# Patient Record
Sex: MALE | Race: WHITE | NOT HISPANIC OR LATINO | Employment: OTHER | URBAN - METROPOLITAN AREA
[De-identification: names, ages, dates, MRNs, and addresses within clinical notes are randomized per-mention and may not be internally consistent; named-entity substitution may affect disease eponyms.]

---

## 2017-01-26 ENCOUNTER — ALLSCRIPTS OFFICE VISIT (OUTPATIENT)
Dept: OTHER | Facility: OTHER | Age: 72
End: 2017-01-26

## 2017-05-01 DIAGNOSIS — I50.30 DIASTOLIC CONGESTIVE HEART FAILURE (HCC): ICD-10-CM

## 2017-05-01 DIAGNOSIS — I42.8 OTHER CARDIOMYOPATHIES (HCC): ICD-10-CM

## 2017-05-09 ENCOUNTER — ALLSCRIPTS OFFICE VISIT (OUTPATIENT)
Dept: OTHER | Facility: OTHER | Age: 72
End: 2017-05-09

## 2017-06-06 ENCOUNTER — HOSPITAL ENCOUNTER (OUTPATIENT)
Dept: NON INVASIVE DIAGNOSTICS | Facility: CLINIC | Age: 72
Discharge: HOME | End: 2017-06-06
Payer: MEDICARE

## 2017-06-06 DIAGNOSIS — I50.30 DIASTOLIC CONGESTIVE HEART FAILURE (HCC): ICD-10-CM

## 2017-06-06 DIAGNOSIS — I42.8 OTHER CARDIOMYOPATHIES (HCC): ICD-10-CM

## 2017-06-06 PROCEDURE — 93306 TTE W/DOPPLER COMPLETE: CPT

## 2017-08-01 ENCOUNTER — ALLSCRIPTS OFFICE VISIT (OUTPATIENT)
Dept: OTHER | Facility: OTHER | Age: 72
End: 2017-08-01

## 2018-01-10 NOTE — MISCELLANEOUS
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 to trial Voltaren, but he is not following his INR  He or his wife should make sure with whoever is following with Coumadin that is also OK with them as well  If they are not able to obtain approval, they can trial medication small amount BID to the knee for 3 days and monitor any changes with bleeding or bruising  Annie Clements - 23 Mar 2016 10:44 AM     TASK EDITED  Attempted to call pt  and LMOM to CB  TyreeAnnie dixon - 23 Mar 2016 10:44 AM     TASK IN PROGRESS   Annie Clements - 24 Mar 2016 9:25 AM     TASK EDITED  S/w Mrs Mejia and she will s/w Dr Luciana Townsend who monitors his INR and clarify with him about the Voltaren gel  She will notify us  Denis Phipps - 24 Mar 2016 10:00 AM     TASK REPLIED TO: Previously Assigned To Celena Oakley  Thank you  Active Problems    1  Bowel and bladder incontinence (788 30,787 60) (R32,R15 9)   2  Cardiomyopathy, nonischemic (425 4) (I42 9)   3  Cerebral infarction, unspecified (434 91) (I63 9)   4  CHF (congestive heart failure) (428 0) (I50 9)   5  Dysphagia (787 20) (R13 10)   6  Dystonia (781 0) (G24 9)   7  Global aphasia (784 3) (R47 02)   8  Left knee pain (719 46) (M25 562)   9  Mural thrombus of heart (410 90) (I21 3)   10  Neurogenic bladder (596 54) (N31 9)   11  Neurogenic bowel (564 81) (K59 2)   12  PAF (paroxysmal atrial fibrillation) (427 31) (I48 0)   13  Primary osteoarthritis of left knee (715 16) (M17 12)   14  Seizure   15  Shoulder subluxation, left (831 00) (S43 002A)   16  Stroke, 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)    Current Meds   1  Acetaminophen 8 Hour 650 MG Oral Tablet; TAKE 1 TABLET 3-4 TIMES DAILY AS   NEEDED;    Therapy: (Recorded:02Oct2015) to Recorded   2  Amantadine HCl - 100 MG Oral Capsule; TAKE ONE CAPSULE BY MOUTH TWICE A   DAY  DX: Embolic Stroke; Last Rx:30Yqr1971 Ordered   3  Antivert 12 5 MG TABS (Meclizine HCl); TAKE 1 TABLET 3 TIMES DAILY AS NEEDED; Therapy: (Recorded:18Feb2016) to Recorded   4  Atorvastatin Calcium 80 MG Oral Tablet; TAKE 1 TABLET DAILY; Therapy: (Recorded:59Dqv9248) to Recorded   5  Bicalutamide 50 MG Oral Tablet; TAKE 1 TABLET DAILY; Therapy: 52MHE6493 to Recorded   6  Botox 100 UNIT Injection Solution Reconstituted; every 3 months; Therapy: 27Apr2015 to (Last Rx:27Apr2015) Ordered   7  Dulcolax Stool Softener CAPS; TAKE CAPSULE  PRN; Therapy: (Pedro Dukes) to Recorded   8  Escitalopram Oxalate 10 MG Oral Tablet; TAKE 1 TABLET DAILY; Therapy: (Pedro Dukes) to Recorded   9  Hydrocortisone CREA; APPLY ONCE DAILY AS NEEDED; Therapy: (Pedro Dukes) to Recorded   10  LevETIRAcetam 100 MG/ML Oral Solution; Take 1 teaspoonful by mouth at 9am and 1    teaspoonful by mouth at 9pm;    Therapy: (Recorded:45Npb3304) to Recorded   11  Lexapro 10 MG Oral Tablet (Escitalopram Oxalate); Therapy: (Recorded:15Mar2016) to Recorded   12  Lidocaine 5 % External Patch; APPLY AS DIRECTED; Therapy: (29-29-69-33) to Recorded   13  Meclizine HCl - 12 5 MG Oral Tablet Recorded   14  Methocarbamol 750 MG Oral Tablet; TAKE 1 TABLET 3 TIMES DAILY; Last    Rx:29Oct2015 Ordered   15  Milk of Magnesia SUSP; USE AS DIRECTED; Therapy: (Pdero Dukes) to Recorded   16  MiraLax PACK (Polyethylene Glycol 3350) Recorded   17  Mirtazapine 7 5 MG Oral Tablet; TAKE 1 TABLET AT BEDTIME; Therapy: (Recorded:18Feb2016) to Recorded   18  Nystatin 303540 UNIT/GM External Powder; Use as direcred as needed; Therapy: (Recorded:30Uim1248) to Recorded   19  Senna-S TABS; TAKE 1 OR 2 TABLETS DAILY TO PREVENT CONSTIPATION; Therapy: (Pedro Dukes) to Recorded   20   TraZODone HCl - 100 MG Oral Tablet; Therapy: (Recorded:15Mar2016) to Recorded   21  Tylenol 8 Hour 650 MG Oral Tablet Extended Release; Therapy: (Recorded:15Mar2016) to Recorded   22  Vitamin D3 1000 UNIT Oral Capsule; take 1 capsule daily; Therapy: (Recorded:18Feb2016) to Recorded   23  Voltaren 1 % Transdermal Gel; Apply to affected joints 2 to 3 times daily; Therapy: 84IHE8236 to (Last Rx:15Mar2016) Ordered   24  Warfarin Sodium 1 MG Oral Tablet; TAKE 1 TABLET DAILY; Therapy: (Recorded:61Ypf1928) to Recorded   25  Warfarin Sodium 2 MG Oral Tablet; TAKE 1 TABLET DAILY; Therapy: (Recorded:23Ssf0353) to Recorded    Allergies    1   No Known Drug Allergies    Signatures   Electronically signed by : Juan C Martínez, ; Mar 24 2016 10:03AM EST                       (Author)

## 2018-01-10 NOTE — MISCELLANEOUS
Message   Recorded as Task   Date: 07/28/2016 01:23 PM, Created By: Kerri Smith   Task Name: Follow Up   Assigned To: [facility]   Regarding Patient: Javier Reddy, Status: In Progress   Comment:    Aj Motau - 28 Jul 2016 1:23 PM     TASK CREATED  Caller: Danny Arriaga, Spouse; Other; [phone] (Day)  Pt's wife,Olena,left VM today stating that the compound cream that DR Nancy De Paz ordered cannot be received through [facility] b/c they do not make it  Danny Arriaga is req us [place] [address] [place]  VM left for Olena to cb  Jesse Mota - 28 Jul 2016 1:23 PM     TASK IN PROGRESS   Jesse Mota - 28 Jul 2016 2:30 PM     TASK REASSIGNED: Previously Assigned To [facility]    Pt's wife left VM asking to fax order to [address] [place] [address] in [address] [place] [address]    Please advise  Denis Miller - 29 Jul 2016 3:48 PM     TASK REPLIED TO: Previously Assigned To Denis Miller                      As far as I'm aware, [facility] not [facility] compound cream     I can order through compounding pharmacy, [facility]  If patient's wife would prefer [facility] and they do compound creams, can you please obtain their order sheet? Mark Anthony Hamburg - 01 Aug 6656 99:78 PM     TASK EDITED             Patient wife called asking about a call from [place] - I explained that our office deals with [employer] and other compounding companies and they will mail to home - patient will contact [facility] and check prices and will give us a call if she is going [facility]  [facility] - 01 Aug 8830 08:80 PM     TASK EDITED   Stiven Joy - 01 Aug 2016 2:30 PM     TASK REPLIED TO: Previously Assigned To Denis Miller  Thank you   Mark Anthony Hamburg - 01 Aug 4411 3:59 PM     TASK EDITED                 Patient [facility] called back again  [facility]   Pt wife is requesting you send [facility]     I called pharm and they said to send an rx with the ingredients you would like and they will compound  I explained patient wife is requesting a 1 week supply to find out if cream works  Denis Miller - 02 Aug 2016 8:13 AM     TASK REPLIED TO: Previously Assigned To Denis Miller  1 week supply sent to [facility]  Rhoda Lacy - 65 Aug 2016 8:40 AM     TASK REPLIED TO: Previously Assigned To [facility]  S/w pt's wife, Arely Macias, per release, and advised of above  Arely Macias was appreciative, and states that she hates putting us through the extra step, but the [facility] pharmacy is significantly cheaper  Arely Macias was advised that it was no problem, and that [facility] should contact her or pt  when the cream is available for   Rhoda Lacy - 27 Aug 2016 8:41 AM     TASK IN PROGRESS        Active Problems    1  (HFpEF) heart failure with preserved ejection fraction (428 9) (I50 30)   2  Bowel and bladder incontinence (788 30,787 60) (R32,R15 9)   3  Cardiomyopathy, nonischemic (425 4) (I42 9)   4  Cerebral infarction, unspecified (434 91) (I63 9)   5  Dysphagia (787 20) (R13 10)   6  Dystonia (781 0) (G24 9)   7  Global aphasia (784 3) (R47 02)   8  Left knee pain (719 46) (M25 562)   9  Mural thrombus of heart (410 90) (I21 3)   10  Neurogenic bladder (596 54) (N31 9)   11  Neurogenic bowel (564 81) (K59 2)   12  PAF (paroxysmal atrial fibrillation) (427 31) (I48 0)   13  Primary osteoarthritis of left knee (715 16) (M17 12)   14  Seizure   15  Shoulder subluxation, left (831 00) (S43 002A)   16  Stroke, embolic (445 02) (B67 9)    Current Meds   1  Acetaminophen 8 Hour 650 MG TABS; TAKE 1 TABLET 3-4 TIMES DAILY AS NEEDED; Therapy: (Recorded:02Oct2015) to Recorded   2  Antivert 12 5 MG TABS (Meclizine HCl); TAKE 1 TABLET 3 TIMES DAILY AS NEEDED; Therapy: (Recorded:18Feb2016) to Recorded   3  Atorvastatin Calcium 80 MG Oral Tablet; TAKE 1 TABLET DAILY;    Therapy: (Recorded:58Kgc2330) to Recorded   4  Bicalutamide 50 MG Oral Tablet; TAKE 1 TABLET DAILY; Therapy: 40PBN9197 to Recorded   5  Dulcolax Stool Softener CAPS; TAKE CAPSULE  PRN; Therapy: (Anabela Score) to Recorded   6  Escitalopram Oxalate 10 MG Oral Tablet; TAKE 1 TABLET DAILY; Therapy: (Anabela Score) to Recorded   7  Hydrocortisone CREA; APPLY ONCE DAILY AS NEEDED; Therapy: (Anabela Score) to Recorded   8  LevETIRAcetam 100 MG/ML Oral Solution; Take 1 teaspoonful by mouth at 9am and 1   teaspoonful by mouth at 9pm;   Therapy: (Recorded:44Nqz1000) to Recorded   9  Methocarbamol 750 MG Oral Tablet; TAKE 1 TABLET 3 TIMES DAILY; Last   Rx:26Rsu8954 Ordered   10  MiraLax PACK (Polyethylene Glycol 3350) Recorded   11  Nystatin 036914 UNIT/GM External Powder; Use as direcred as needed; Therapy: (Recorded:82Foz6437) to Recorded   12  Remeron 15 MG Oral Tablet (Mirtazapine); TAKE 1/2 TABLET AT BEDTIME; Therapy: (Recorded:18Vew3166) to Recorded   13  Senna-S TABS; TAKE 1 OR 2 TABLETS DAILY TO PREVENT CONSTIPATION; Therapy: (Anabela Score) to Recorded   14  SLPG Compound Medication; Flurbiprofen 5%, Cyclobenzaprine 2%, Lidocaine 5%; Therapy: 69NKA6850 to (Evaluate:72Idu0251); Last Rx:00Ogk5595 Ordered    Allergies    1   No Known Drug Allergies    Signatures   Electronically signed by : Elder Tejeda RN; Aug  2 2016  8:41AM EST                       (Author)

## 2018-01-10 NOTE — RESULT NOTES
Message   Recorded as Task   Date: 07/11/2016 12:55 PM, Created By: Munir Panda   Task Name: Medical Complaint Callback   Assigned To: [facility]   Regarding Patient: Quang Marcos, Status: Active   Comment:    Rhoda Lacy - 11 Jul 2016 12:55 PM     TASK CREATED  Caller: Laine Torres, Spouse; Medical Complaint  Received VM from pt's wife, Naya Felipe, on [facility] triage line from [phone] am  Naya Felipe states that the pt  had a genicular nerve block, and then was given voltaren to help w/ the pain, but it is not helping pt  No c/b number provided  Jesse Mota - 11 Jul 2016 1:23 PM     TASK REPLIED TO: Previously Assigned To [employer] with pt's wife  Pt is s/p LEFT gen NB #2 6-15-16  No pain diary noted  Pt's wife states his pain level went from a 9/10 to a 7/10 after the NB  States rainy days are worse  States he sleeps well at night  They are asking if they can proceed with ablation? I advised that we like to see 50% or greater pain relief post bolock to proceed with ablation  Pt is using Glorya Salaam cream,a natural substance,with good relief  Wife asking what other tx options are available? Not intereted in refilling the voltaren  Denis Miller - 14 Jul 2016 2:07 PM     TASK REPLIED TO: Previously Assigned To Denis Miller  He did not appear to have significant relief with the second nerve block  I do not think ablation would help  We can consider other compound agents that I can order  We also considered the DRG which is a trial and implant that would require hold of anticoagulation  Jesse Mota - 14 Jul 2016 3:22 PM     TASK REPLIED TO: Previously Assigned To [facility]  Spoke with Olena,pt's wife, and advised of the same  Not interested in DRG at this time  They are interested in trying compound cream   She states she receives all his meds through the [facility]   Asking if we can mail her a script for the compound cream to see if she can have it filled through them? She is also asking for documentation that the MBB failed and that is why compound cream is being ordered  Denis Miller - 19 Jul 2016 9:20 AM     TASK REPLIED TO: Previously Assigned To Denis Miller  I have letter and order printed  The compound cream with [facility] may be different composition, so if there are other agents, I can look into it as well    She/They can send us the form   Jesse Mota - 19 Jul 2016 9:44 AM     TASK REPLIED TO: Previously Assigned To [facility]  Spoke with pt's wife,Olena  She is requesting order and letter be faxed to pt's Count includes the [facility] Brianne Paige fax #[phone] and that she could take it directly to [place] states it is a hardship to come to office to drop anything off or  b/c she cannot leave her  at home alone  Are you ok with me faxing? Denis Miller - 19 Jul 2016 10:10 AM     TASK REPLIED TO: Previously Assigned To Denis Miller                      Yes once the letter is ready, I will bring it toyJesse Mcfarland - 19 Jul 2016 1:19 PM     TASK REPLIED TO: Previously Assigned To [facility]   Compound cream order and letter faxed to Janki Fields at number provided  Signatures   Electronically signed by :  Lenny Ward, ; Jul 19 2016  1:19PM EST                       (Author)

## 2018-01-11 NOTE — RESULT NOTES
Message   Recorded as Task   Date: 05/17/2016 01:19 PM, Created By: Lexis Salcido   Task Name: Follow Up   Assigned To: [facility]   Regarding Patient: Miller Cadet, Status: In Progress   Comment:    Annie Clements - 17 May 2016 1:19 PM     TASK CREATED  Caller: Self; General Medical Question; [phone] (Home); [phone] (Mobile Phone)  Received a call from Mrs Mejia today  She is inquirying as increasing the gabapentin to TID  She wanted to get directions as to when he should take the medication  On the two gabapentin QD he still rates his pain a 7  Can he also take the 7 5 remeron at ? HD to advise  thanks   Henrietta Be - 17 May 2016 4:12 PM     TASK REPLIED TO: Previously Assigned To [facility]  He can increase it to TID  Already discussed side effect profile, she should monitor for sedation, drowsiness and unsteadiness  Annie Clements - 18 May 2016 8:18 AM     TASK EDITED  Can he take the remeron at [facility]? Henrietta Be - 18 May 2016 8:35 AM     TASK REPLIED TO: Previously Assigned To Denis Phipps  He should be care of increase in side effects with both  he can take it, but if dizziness or confusion continue or worsen, he should return to previous dose  Annie Clements - 18 May 2016 10:59 AM     TASK EDITED  Attempted to call the pt  and left a detailed mom as to the previous task per HD  Pt  to CB with questions  Annie Clements - 18 May 2016 10:59 AM     TASK IN PROGRESS        Signatures   Electronically signed by :  [facility], ; May 20 2016  8:27AM EST                       (Author)

## 2018-01-11 NOTE — MISCELLANEOUS
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, along with  global aphasia  A summary of his health information is stated b low as per wife's request  Request is made for VA benefits     -He has been improving slowly but steadily  Currently he is ambulating  feet with rolling walker at min assist  level (L KAFO) and performing stand pivot transfers at contact guard assist level    -Continue secondary stroke prophylaxis with statin, Coumadin, and modification of risk factors    -Discontinued amantadine and monitor for any worsening symptoms  He has been on this medication for one year  He has tried Provigil while he was in the hospital which was discontinued due to lack of results  Remeron added by PCP to adjust sleep-wake cycle  Educated patient wife about preventing contractures/skin breakdown  No episodes of agitation anymore and he is off Zyprexa at this point    -Patient getting great benefit from left knee ankle foot orthosis   -Severe right-sided spastic dystonia: Botox on 4/27/15, 8/10/15 and 11/9/15  Continue stretching exercises  -MSK: Left knee pain degenerative in nature  MRI of the left knee with tricompartmental arthritis with meniscal tears  Continue to follow up with pain management with Dr Adelaide Agrawal  Patient may use Robaxin as needed for spasm/pain  Continue acetaminophen  Not a candidate for anti-inflammatories due to anticoagulation  Please feel free to call me with any questions  Regards  CHRISTOPHER Carlin    Physical Medicine and Joya Santoro      Electronically signed by:Yossi Godinez DO  May 13 2016  7:24PM EST Acknowledgement

## 2018-01-12 NOTE — MISCELLANEOUS
Message   Recorded as Task   Date: 04/27/2016 11:56 AM, Created By: Jabari Lauren   Task Name: Follow Up   Assigned To: [facility]   Regarding Patient: Yuliet Leon, Status: Active   Comment:    Kesha Chahal - 27 Apr 2016 11:56 AM     TASK CREATED  Caller: Mary Cates; General Medical Question; [phone]  Wife called stating that pt's pcp said it was alright to start gabapentin as prescribed by Cathi De La Cruz  Wife just wanted to confirm if it was ok to take with keppra  Dr Phipps advised pt to contact neurologist to make sure  Wife verbalized understanding  She will call the neurologist and if it is alright to take together she will p/u the medication from the pharmacy and pt will take as per titration that was reviewed  Denis Phipps - 27 Apr 2016 12:16 PM     TASK REPLIED TO: Previously Assigned To [facility]  Thank you  Active Problems    1  Bowel and bladder incontinence (788 30,787 60) (R32,R15 9)   2  Cardiomyopathy, nonischemic (425 4) (I42 9)   3  Cerebral infarction, unspecified (434 91) (I63 9)   4  CHF (congestive heart failure) (428 0) (I50 9)   5  Dysphagia (787 20) (R13 10)   6  Dystonia (781 0) (G24 9)   7  Global aphasia (784 3) (R47 02)   8  Left knee pain (719 46) (M25 562)   9  Mural thrombus of heart (410 90) (I21 3)   10  Neurogenic bladder (596 54) (N31 9)   11  Neurogenic bowel (564 81) (K59 2)   12  PAF (paroxysmal atrial fibrillation) (427 31) (I48 0)   13  Primary osteoarthritis of left knee (715 16) (M17 12)   14  Seizure   15  Shoulder subluxation, left (831 00) (S43 002A)   16  Stroke, embolic (227 08) (S95 1)    Current Meds   1  Acetaminophen 8 Hour 650 MG Oral Tablet; TAKE 1 TABLET 3-4 TIMES DAILY AS   NEEDED; Therapy: (Recorded:02Oct2015) to Recorded   2  Amantadine HCl - 100 MG Oral Capsule; TAKE ONE CAPSULE BY MOUTH TWICE A   DAY  DX: Embolic Stroke; Last Rx:12Aao8368 Ordered   3   Antivert 12 5 MG TABS (Meclizine HCl); TAKE 1 TABLET 3 TIMES DAILY AS NEEDED; Therapy: (Recorded:44Upy3880) to Recorded   4  Atorvastatin Calcium 80 MG Oral Tablet; TAKE 1 TABLET DAILY; Therapy: (Recorded:18Xvu7047) to Recorded   5  Bicalutamide 50 MG Oral Tablet; TAKE 1 TABLET DAILY; Therapy: 82PPO8441 to Recorded   6  Botox 100 UNIT Injection Solution Reconstituted; every 3 months; Therapy: 10Lde1191 to (Last Rx:27Apr2015) Ordered   7  Dulcolax Stool Softener CAPS; TAKE CAPSULE  PRN; Therapy: (Anabela Brandt) to Recorded   8  Escitalopram Oxalate 10 MG Oral Tablet; TAKE 1 TABLET DAILY; Therapy: (Anabela Brandt) to Recorded   9  Gabapentin 100 MG Oral Capsule; TAKE 1 CAPSULE 3 TIMES DAILY; Therapy: 72GFB0921 to (Serg Earl)  Requested for: 26Apr2016; Last   Rx:26Apr2016 Ordered   10  Hydrocortisone CREA; APPLY ONCE DAILY AS NEEDED; Therapy: (Anabela Brandt) to Recorded   11  LevETIRAcetam 100 MG/ML Oral Solution; Take 1 teaspoonful by mouth at 9am and 1    teaspoonful by mouth at 9pm;    Therapy: (Recorded:03Hnd7920) to Recorded   12  Methocarbamol 750 MG Oral Tablet; TAKE 1 TABLET 3 TIMES DAILY; Last    Rx:16Zuk3404 Ordered   13  MiraLax PACK (Polyethylene Glycol 3350) Recorded   14  Nystatin 895628 UNIT/GM External Powder; Use as direcred as needed; Therapy: (Recorded:41Mkk8080) to Recorded   15  Senna-S TABS; TAKE 1 OR 2 TABLETS DAILY TO PREVENT CONSTIPATION; Therapy: (Anabela Brandt) to Recorded   16  Vitamin D3 1000 UNIT Oral Capsule; take 1 capsule daily; Therapy: (Recorded:18Feb2016) to Recorded   17  Warfarin Sodium 1 MG Oral Tablet; TAKE 1 TABLET DAILY; Therapy: (Recorded:91Dxd7037) to Recorded   18  Warfarin Sodium 2 MG Oral Tablet; TAKE 1 TABLET DAILY; Therapy: (Recorded:71Apf6318) to Recorded    Allergies    1   No Known Drug Allergies    Signatures   Electronically signed by : Michael Jordan RN; Apr 27 2016 12:20PM EST                       (Author)

## 2018-01-12 NOTE — RESULT NOTES
Message   Recorded as Task   Date: 06/23/2016 09:09 AM, Created By: Salazar Bland   Task Name: Follow Up   Assigned To: [facility]   Regarding Patient: Madalynn Saint, Status: Active   CommentCijuve Ponce - 23 Jun 1518 1:56 AM     TASK CREATED  S/P LEFT GENICULAR NERVE BLOCK # 2 ON 6/15/2016 W/ DR PALOMARES/THAO - ERIKA F/U SCHEDULED   Clarissa Silva - 23 Jun 2890 3:49 PM     TASK EDITED  Lm on Vm to Cb   Salazar Bland - 27 Jun 5028 4:24 PM     TASK EDITED  Lm on Vm to Cb   Clarissa Silva - 29 Jun 7325 33:49 AM     TASK EDITED  From after the injection pain level was   6/7 out of 10   then at night pain came back between a 8/10     Tylenol was given and helpful     Currently during the day he does well but past dinner time pain comes back? Thanks   Denis Palomares - 29 Jun 2016 12:33 PM     TASK REPLIED TO: Previously Assigned To Denis Palomares  I need the pain diary   Clarissa Silva - 30 Jun 1188 9:45 PM     TASK EDITED  Lm on pt VM asking to drop off pain diary or fax or mail  Salazar Bland - 30 Jun 1822 4:96 PM     TASK EDITED  Spoke   with patient wife she says this is exactely the way his pain went  Started at   9  after injection it was a 7 and continued like that through out the day   @ night he was able to sleep ok then the next day it was like a 7  Wife said patient is off coumadin now and she is going to start to use the Voltaren Gel  Denis Palomares - 30 Jun 2016 1:21 PM     TASK REPLIED TO: Previously Assigned To Denis Palomares  It doesn't seem that patient had significant relief from the procedure, we should not proceed, we can see how he does wtih voltaren  Salazar Bland - 30 Jun30 Jun 4779 6:81 PM     TASK EDITED  Spoke with patient wife and advised of below she will try Voltaren and CB with update  She was agreeable          Signatures   Electronically signed by : Shruthi Walton, ; Jun 30 2016  2:07PM EST (Author)

## 2018-01-13 VITALS
SYSTOLIC BLOOD PRESSURE: 128 MMHG | HEIGHT: 70 IN | HEART RATE: 56 BPM | DIASTOLIC BLOOD PRESSURE: 64 MMHG | OXYGEN SATURATION: 97 %

## 2018-01-13 NOTE — RESULT NOTES
Message   Recorded as Task   Date: 05/18/2016 01:11 PM, Created By: Lissett Fermin   Task Name: Follow Up   Assigned To: [facility]   Regarding Patient: Rudi Gillette, Status: Active   CommentOra Pastures - 18 May 2249 7:20 PM     TASK CREATED  S/P LEFT GENICULAR NERVE BLOCK ON 5/11/2016 W/ DR PHIPPS    NO F/U Susan Dunbar - 18 May 2016 2:23 PM     TASK EDITED  1st attempt to call, no answer  Left message for pt  to C/B   Haya Shows - 19 May 5682 4:39 PM     TASK EDITED  Spoke with pt wife and she said that she sent the pain diary back - I  advised we do not have a copy of it  She will refax it  Haya Shows - 25 May 3803 2:32 PM     TASK EDITED  Spoke with patient's wife - she states she sent in the pain diary but we did not recieve it  She gave info over the phone and it is as follows    9:15 - 80%  9:30 - 80%  10:30 between 50-80%  until 4pm or so   then 50% until then next morning it went down to less then 50%     Also patients wife advised that he has been off gabapentin for about a week now and his alertness is much better  Thanks please advise next step  Denis Phipps - 25 May 2016 3:47 PM     TASK REPLIED TO: Previously Assigned To Denis Phipps  Thats significant  We can schedule for 2nd genicular nerve block  Lacinda Shows - 26 May 5333 1:79 AM     TASK EDITED  Spoke with patient wife - scheduled 2nd block  Went over all preprocedure instrucions  NPO 1 hour prior, no antibx and needs a   She understood          Signatures   Electronically signed by : Wood Chang, ; May 26 2016  8:46AM EST                       (Author)

## 2018-01-14 VITALS
SYSTOLIC BLOOD PRESSURE: 102 MMHG | HEIGHT: 70 IN | OXYGEN SATURATION: 99 % | DIASTOLIC BLOOD PRESSURE: 56 MMHG | HEART RATE: 55 BPM

## 2018-01-14 NOTE — PROGRESS NOTES
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): The patient with flaccid left hemiplegia and right spastic dystonia, along with global aphasia    -He has been improving slowly but steadily  Currently he is ambulating  feet with rolling walker at min assist level (L KAFO) and performing stand pivot transfers at contact guard assist level    -Continue secondary stroke prophylaxis with statin, Coumadin, and modification of risk factors    -Discontinue amantadine and monitor for any worsening symptoms  He has been on this medication for one year  His cognition appears to be plateauing  He has tried Provigil while he was in the hospital which was discontinued due to lack of results  Remeron added by PCP to adjust sleep-wake cycle  Educated patient wife about preventing contractures/skin breakdown  No episodes of agitation anymore and he is off Zyprexa at this point  Continue meclizine/Epley's maneuvers for vertigo   -Patient getting great benefit from left knee ankle foot orthosis     #) Severe right-sided spastic dystonia: Botox on 4/27/15, 8/10/15 and 11/9/15  As per wife although she has noticed some improvement in the past, she does not want to continue the injections  He is getting outpatient therapy now  Continue stretching exercises  Tentative ultrasound guided Botox plan (total 300 units) in case if needed in the future  -Right pectoralis major 80 units, biceps 50 units, FCR 50 units(palpable muscle)  -Right hamstrings 50 units, right gastrocs 50 units, right soleus 20 units(not well visualized on US)    #) MSK: Left knee pain degenerative in nature   MRI of the left knee with tricompartmental arthritis with meniscal tears  Continue to follow up with pain management with Dr Bao Frank  Patient may use Robaxin as needed for spasm/pain  Continue acetaminophen  Not a candidate for anti-inflammatories due to anticoagulation  #) Cervical dystonia: Much improved  Reinforced left-sided activities  If worsens might consider Botox to SCM/splenius capitis  #) Seizure disorder: Continue Keppra  No episodes of seizures since the initial one on November 17, 2014  F/u with Dr Pink    #) Paroxysmal AFib/hx of Takotsubo Cardiomyopathy: Continue rate control and Coumadin  #) Anxiety/Depression: Continue Lexapro  #) Urinary incontinence (History of prostate cancer status post surgery): Much improved  Likely central disinhibited bladder  Stressed the importance of skin integrity in the event of urinary incontinence  Continue to followup with the urologist Dr Getachew Guzman  He mostly has functional incontinence  #) Bowel incontinence/constipation: Much improved with bowel regimen  Goal is to have bowel movement at the same time  Currently having a bowel movement every 3 days with no incontinence episodes in between  May use senna, enema and MiraLAX as needed  #) Disposition: He is to follow up with me in 4 months  Chief Complaint  Chief Complaint Free Text Note Form: rehab f/u      History of Present Illness  HPI Physical Medicine and Rehab:   ARC ADM DATE: 12/19/2014  ARC DIS DATE: 01/16/2015    PRIMARY DIAGNOSIS   Right middle cerebral artery and left frontal lobe cerebrovascular accident  HOSPITAL COURSE  The patient is a 68-year-old man who presented to [address] [place] [address] on November 8, 2014 with left facial droop and left-sided weakness  He was found to have a right middle cerebral artery stroke, which was felt to be embolic  He was given intravenous tPA with minimal improvement in his symptoms   He was also found to have a hemorrhagic conversion of the stroke after TPA  He did develop Methicillin-susceptible Staphylococcus aureus pneumonia and required tracheostomy and PEG placement  He also had a seizure on November 17, 2014 and was started on Keppra  He was eventually transferred to [address] [place] [address] on November 28, 2014 and weaning was successful  He did have urinary retention and was followed by Urology  He was also noted to be restless and anxious, and was started on Zyprexa  The patient was determined to be stable for transfer to the acute rehabilitation unit on December 19, 2014  He was seen by Dr Venessa Webb, and local wound care did resolve the infection  He was also seen by Dr Jami Coates secondary to urinary retention  He did undergo clean intermittent catheterization and eventually urinary retention did resolve  He was also followed by Dr Jason Pruitt secondary to atrial fibrillation and Coumadin management  The patient was very sensitive to Coumadin and required multiple medication modifications  In regards to his progression in therapy, the patient required moderate assistance with upper body activities of daily living, and cognition  He required total assistance for lower body activities of daily living, toileting, bowel and bladder management, transfers, and toilet transfers  He required maximum assist with bathing and to propel a wheelchair 150 feet  Due to the fact that the patient would require more assistance than his family could provide at home, it was determined that he would benefit from further rehabilitation at a subacute level  He was, therefore, discharged on January 12, 2015, to [facility]  PRESENT STATUS:  He is here with his wife today  He continues to do very well functionally  He is ambulating with the left KAFO in therapy - ~100ft  He is transferring at contact guard assist level as per wife  He has a tilt in wheelchair  Wife has not noticed any significant difference without the Botox at this point  His cognition continues to improve   He still has difficulty controlling the movements on the right upper extremity and right lower extremity  She does not think Botox is making a significant difference in his functional movements  His left upper extremity and left lower ext continues to be very flaccid  She has noticed some movements occasionally on the left lower extremity  No complaints of pain  He continues to have left knee pain  He is following pain management for that  He is to get a geniculate block by Dr Mohinder Ernst  Bowel and bladder still the same  He was started on gabapentin for the left knee pain  He was also getting vertigo for which he had Epley's maneuvers in physical therapy which has helped  He is getting OUTPT PT    He remains incontinent of bowel and bladder  As per wife he has been making steady gains  His verbal output has improved significantly  He makes his needs known very consistently  he has not had any episodes of seizures  He has not had any episodes of agitation  He is still very flaccid on the left side  He listens to his wife much more consistently  Review of SystemsPhysical Medicine Rehab ROS:   Constitutional: no fever, no chills, no recent weight gain, no recent weight loss, no complaints of feeling tired, no changes in appetite  HEENT:  no sinus problems, not feeling congested, no blurred vision, no dryness of the eyes, no eye pain, no hearing loss, no tinnitus, no mouth sores, no sore throat, no hoarseness, no dysphagia, no masses, no bleeding  Cardiovascular:  no chest pain or pressure, no palpitations present, the heart rate was not rapid or irregular, no swelling in the arms or legs, no poor circulation  Respiratory:  no unusual or persistant cough, no shortness of breath with or without exertion  Gastrointestinal:  no nausea, no vomiting, no diarrhea, no abdominal pain, no changes in bowel habits, no melena, no loss of bowel control     Genitourinary:  no incontinence, no feelings of urinary urgency, no increase in frequency, no urinary hesitancy, no dysuria, no hematuria  Musculoskeletal: arthralgias  Integumentary  no masses, no rash, no skin lesions, no livedo reticularis  Psychiatric:  no anxiety, no depression, no mood swings, no psychiatric hospitalizations, no sleep problems  Endocrine  no unusual weight loss or gain, no excessive urination, no excessive thirst, no hair loss or gain, no hot or cold intolerance, no menstrual period change or irregularity, no loss of sexual ability or drive, no erection difficulty, no nipple discharge  Hematologic/Lymphatic:  no unusual bleeding, no tendency for easy bruising, no clotting skin or lumps  Neurological General:  no headache, no nausea or vomiting, no lightheadedness, no convulsions, no blackouts, no syncope, no trauma, no photopsia, no increased sleepiness, no trouble falling asleep, no snoring, no awakening at night  Neurological Mental Status:  no confusion, no mood swings, no alteration or loss of consciousness, no difficulty expressing/understanding speech, no memory problems  Neurological Cranial Nerves:  no blurry or double vision, no loss of vision, no face drooping, no facial numbness or weakness, no taste or smell loss/changes, no hearing loss or ringing, no vertigo or dizziness, no dysphagia, no slurred speech  Neurological Motor findings include:  no tremor, no twitching, no cramping(pre/post exercise), no atrophy  Neurological Coordination:  no unsteadiness, no vertigo or dizziness, no clumsiness, no problems reaching for objects  Neurological Sensory:  no numbness, no pain, no tingling, does not fall when eyes closed or taking a shower  Neurological Gait:  no difficulty walking, not falling to one side, no sensation of being pushed, has not had falls  ROS Reviewed:   ROS reviewed  Active Problems    1  Bowel and bladder incontinence (788 30,787 60) (R32,R15 9)   2  Cardiomyopathy, nonischemic (425 4) (I42 9)   3  Cerebral infarction, unspecified (434 91) (I63 9)   4  CHF (congestive heart failure) (428 0) (I50 9)   5  Dysphagia (787 20) (R13 10)   6  Dystonia (781 0) (G24 9)   7  Global aphasia (784 3) (R47 02)   8  Left knee pain (719 46) (M25 562)   9  Mural thrombus of heart (410 90) (I21 3)   10  Neurogenic bladder (596 54) (N31 9)   11  Neurogenic bowel (564 81) (K59 2)   12  PAF (paroxysmal atrial fibrillation) (427 31) (I48 0)   13  Primary osteoarthritis of left knee (715 16) (M17 12)   14  Seizure   15  Shoulder subluxation, left (831 00) (S43 002A)   16  Stroke, embolic (376 64) (L21 1)    Past Medical History    1  History of Agent orange exposure (V87 2) (Z77 098)   2  H/O prostate cancer (V10 46) (Z85 46)   3  History of atrial fibrillation (V12 59) (Z86 79)   4  History of hypertension (V12 59) (Z86 79)   5  Primary osteoarthritis of left knee (715 16) (M17 12)   6  History of Takotsubo cardiomyopathy (429 83) (I51 81)  Active Problems And Past Medical History Reviewed: The active problems and past medical history were reviewed and updated today  Surgical History    1  History of Total Knee Arthroplasty  Surgical History Reviewed: The surgical history was reviewed and updated today  Family History  Mother    1  Family history of ST elevation myocardial infarction (STEMI), unspecified artery  Father    2  Family history of ST elevation myocardial infarction (STEMI), unspecified artery  Other    3  Family history of Aneurysm  Unknown    4  Family history of malignant neoplasm of prostate (V16 42) (Z80 42)   5  Family history of thyroid disease (V18 19) (Z83 49)  Family History Reviewed: The family history was reviewed and updated today  Social History    ·    · Never a smoker   · Work history  Social History Reviewed: The social history was reviewed and updated today  Current Meds   1  Acetaminophen 8 Hour 650 MG Oral Tablet; TAKE 1 TABLET 3-4 TIMES DAILY AS   NEEDED;    Therapy: (Recorded:02Oct2015) to Recorded   2  Antivert 12 5 MG TABS; TAKE 1 TABLET 3 TIMES DAILY AS NEEDED; Therapy: (Recorded:18Feb2016) to Recorded   3  Atorvastatin Calcium 80 MG Oral Tablet; TAKE 1 TABLET DAILY; Therapy: (Recorded:10Obv9541) to Recorded   4  Bicalutamide 50 MG Oral Tablet; TAKE 1 TABLET DAILY; Therapy: 29TTN1025 to Recorded   5  Botox 100 UNIT Injection Solution Reconstituted; every 3 months; Therapy: 27Apr2015 to (Last Rx:27Apr2015) Ordered   6  Dulcolax Stool Softener CAPS; TAKE CAPSULE  PRN; Therapy: (Shruthi Beckham) to Recorded   7  Escitalopram Oxalate 10 MG Oral Tablet; TAKE 1 TABLET DAILY; Therapy: (Shruthi Beckham) to Recorded   8  Gabapentin 100 MG Oral Capsule; TAKE 1 CAPSULE 3 TIMES DAILY; Therapy: 80GCG1542 to (Izzy Kirkland)  Requested for: 26Apr2016; Last   Rx:26Apr2016 Ordered   9  Hydrocortisone CREA; APPLY ONCE DAILY AS NEEDED; Therapy: (Shruthi Beckham) to Recorded   10  LevETIRAcetam 100 MG/ML Oral Solution; Take 1 teaspoonful by mouth at 9am and 1    teaspoonful by mouth at 9pm;    Therapy: (Recorded:08Occ9025) to Recorded   11  Methocarbamol 750 MG Oral Tablet; TAKE 1 TABLET 3 TIMES DAILY; Last Rx:29Oct2015    Ordered   12  MiraLax PACK Recorded   13  Nystatin 421492 UNIT/GM External Powder; Use as direcred as needed; Therapy: (Recorded:39Gju4864) to Recorded   14  Remeron 15 MG Oral Tablet; TAKE 1/2 TABLET AT BEDTIME; Therapy: (Recorded:05May2016) to Recorded   15  Senna-S TABS; TAKE 1 OR 2 TABLETS DAILY TO PREVENT CONSTIPATION; Therapy: (Shruthi Beckham) to Recorded   16  Vitamin D3 1000 UNIT Oral Capsule; take 1 capsule daily; Therapy: (Recorded:18Feb2016) to Recorded   17  Vitamin D3 1000 UNIT Oral Tablet; TAKE 1 TABLET DAILY; Therapy: (Recorded:05May2016) to Recorded   18  Warfarin Sodium 1 MG Oral Tablet; TAKE 1 TABLET DAILY; Therapy: (Recorded:68Vhd3547) to Recorded   19   Warfarin Sodium 2 MG Oral Tablet; TAKE 1 TABLET DAILY; Therapy: (Recorded:66Xwl6053) to Recorded  Medication List Reviewed: The medication list was reviewed and updated today  Allergies    1  No Known Drug Allergies    Vitals  Signs [Data Includes: Current Encounter]   Recorded: 22HIO9597 01:57PM   Heart Rate: 56  Respiration: 12  Systolic: 330  Diastolic: 63  Weight Unobtainable: Yes    Physical Exam    Constitutional   General appearance: Abnormal   improved verbal output, dysarthric; no initiation of speech  Head and Face   Head and face: Atraumatic on inspection  Facial strength normal        Neck   Neck and thyroid: Abnormal   ROM limited in sidebend and flex/ext; full in rotation  Pulmonary   Respiratory effort: Lungs are clear bilaterally    Cardiovascular   Peripheral vascular exam: Normal pulses throughout, no tenderness, erythema or swelling  Abdomen: Positive bowel sounds  Musculoskeletal   Gait and station: Abnormal   not tested, in WC  severe 3 fingerbreadth left shoulder subluxation; right shoulder contracture - shoulder abd to 30 deg passively  FROM in all joints in LUE and LLE; Able to dorsiflex both ankles to neutral    Muscle strength: Abnormal   severe left shoulder girdle muscle atrophy (deltoids, supraspinatus, infraspinatus and rhomboids); RLE: 5/5 all throughout; 0/5 on LLE - flaccid with no tone; RUE: 5/5 all throughout except right shoulder abduction; LUE: flaccid 0/5 all throughout, some shoulder shrug noted on left; LLE: quads 1/5; hamstrings 1/5  Muscle tone: Abnormal   moderate elbow and wrist flexion spastic dystonia (mainly affecting biceps, wrist flexors and pectoralis); moderate right lower extremity extension tone (mainly affecting hamstrings and gastrocs); flaccid with no tone on LUE and LLE  Involuntary movements: None observed       Inspection/palpation of joints, bones, and muscles: Abnormal      Range of motion: Abnormal      Stability: Abnormal       Skin   Skin: skin warm and dry with no evidence of unusual rashes or suspicious lesions   Neurologic   Mental Status: Abnormal  knows his name and wife, knew the year, significant dysarticulation, follows 2-3 step commads, severe dysarthria with verbalization, able to spell watch - unable to spell backwards, responds to wife much better  Reflexes: Abnormal   1+ BUE and BLE, symmetric  Coordination: Abnormal     Sensation: Abnormal   inconsistent responses  Cortical function: Abnormal   decreased attention span  Judgment and insight: Abnormal   poor insight, slightly impulsive  Cranial Nerve Exam   II: Normal with no deficit  III,IV, VI:Normal with no deficit    V: Renetta with no deficitl  XI: Normal with no deficit  12th cranial nerve: Abnormal   left tongue deviation            Future Appointments    Date/Time Provider Specialty Site   05/11/2016 08:30 AM Brennon Cuellar MD Pain Management [address] OUTPATIENT   10/18/2016 01:15 PM [address], [facility]rology [facility] [address] [facility]     Signatures   Electronically signed by : Naun Leonard DO; May  6 2016  8:42PM EST                       (Author)

## 2018-01-16 NOTE — RESULT NOTES
Message   Recorded as Task   Date: 04/28/2016 04:56 PM, Created By: Luis Habermann   Task Name: Follow Up   Assigned To: [facility]   Regarding Patient: Zane Naranjo, Status: Active   Comment:    Luis Habermann - 28 Apr 2016 4:56 PM     TASK CREATED  Patient's wife questioned about other injections  I discussed wtih Dr Elver Jane, he also recommends proceeding with the genicular nerve block  Jesse Mota - 29 Apr 2016 11:30 AM     TASK REPLIED TO: Previously Assigned To [facility],Team  Just to confirm:  If pt agreeable,will schedule LEFT knee genicular NB with you? Denis Phipps - 29 Apr 2016 12:59 PM     TASK REPLIED TO: Previously Assigned To Denis Phipps  He is already scheduled  I just wanted to inform them about what Dr Elver Jane stated about the other injections  Jesse Mota - 29 Apr 2016 1:57 PM     TASK REPLIED TO: Previously Assigned To [facility]  Spoke with pt's wife and advised of the same,she is appreciative  She wanted you to know he took the gabapentin last night and had a good nights sleep  Denis Phipps - 29 Apr 2016 4:59 PM     TASK REPLIED TO: Previously Assigned To Richard Rogers, thank you  Signatures   Electronically signed by :  Jesse Mota, ; May  2 2016  7:40AM EST                       (Author)

## 2018-01-17 NOTE — PROGRESS NOTES
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): The patient with flaccid left hemiplegia and right spastic dystonia, along with global aphasia  He has been improving slowly but steadily  Significantly improved functioning of the right upper and right lower extremity with Botox injection on 11/9/15  Currently he is ambulating 15-20 feet with rolling walker at min assist level and performing stand pivot transfers at contact guard assist level  Continue secondary stroke prophylaxis with statin, Coumadin, and modification of risk factors  Continue increased dose of amantadine 100 mg at 9 AM and 12noon as neurostimulant  He has tried Provigil while he was in the hospital which was discontinued due to lack of results  Amantadine seems to be working  No adverse effects noted  Remeron added by PCP to adjust sleep-wake cycle  Educated patient wife about preventing contractures/skin breakdown  No episodes of agitation anymore and he is off Zyprexa at this point  Continue meclizine for vertigo  Patient will benefit from knee ankle foot orthosis now that he is more ambulatory to stabilize the right knee  #) Severe right-sided spastic dystonia: Significant improvement of the right upper and lower extremity dystonia with Botox on 4/27/15, 8/10/15 and 11/9/15  Hand grasp, shoulder range of motion, overall hand/arm function and frequency of arm use much improved  He is able to transfer much more efficiently  Currently residing at home and getting home therapies  May graduate him to outpatient therapy soon   He has followed up with [facility] in the past      Tentative ultrasound guided Botox plan (total 300 units)  -Right pectoralis major 80 units, biceps 50 units, FCR 50 units(palpable muscle)  -Right hamstrings 50 units, right gastrocs 50 units, right soleus 20 units(not well visualized on US)    I advised the wife to give me a call when she notices Botox wearing off  This will help determine the duration of benefit with Botox  Order for Botox placed today  #) MSK: Left shoulder subluxation with muscle atrophy/pain much improved kinesiotaping and shoulder abduction brace  Left knee pain degenerative in nature  MRI of the left knee with tricompartmental arthritis with meniscal tears  Advised patient to follow up with orthopedic surgeon for another steroid injection  Continue increased Robaxin for now (initially started for spasms)  Continue acetaminophen  Not a candidate for anti-inflammatories due to anticoagulation  #) Cervical dystonia: Much improved  Reinforced left-sided activities  If worsens might consider Botox to SCM/splenius capitis  #) Seizure disorder: Continue Keppra  No episodes of seizures since the initial one on November 17, 2014  F/u with Dr Pink    #) Dysphagia, status post PEG tube placement on November 26, 2014: Resolved  Patient tolerating regular soft diet    #) Paroxysmal AFib/hx of Takotsubo Cardiomyopathy: Continue rate control and Coumadin  #) Anxiety/Depression: Continue Lexapro  #) Urinary incontinence (History of prostate cancer status post surgery): Much improved  Likely central disinhibited bladder  Stressed the importance of skin integrity in the event of urinary incontinence  Continue to followup with the urologist Dr Tanja Salter  He mostly has functional incontinence  #) Bowel incontinence/constipation: Much improved with bowel regimen  Goal is to have bowel movement at the same time  Currently having a bowel movement every 3 days with no incontinence episodes in between  May use senna, enema and MiraLAX as needed  #) Disposition: He is to follow up with me in 3 months   Patient advised to call to schedule for next injection once Botox wears off     Chief Complaint  Chief Complaint Free Text Note Form: Botox follow-up      History of Present Illness  HPI Physical Medicine and Rehab:   ARC ADM DATE: 12/19/2014  ARC DIS DATE: 01/16/2015    PRIMARY DIAGNOSIS   Right middle cerebral artery and left frontal lobe cerebrovascular accident  HOSPITAL COURSE  The patient is a 70-year-old man who presented to [address] [place] [address] on November 8, 2014 with left facial droop and left-sided weakness  He was found to have a right middle cerebral artery stroke, which was felt to be embolic  He was given intravenous tPA with minimal improvement in his symptoms  He was also found to have a hemorrhagic conversion of the stroke after TPA  He did develop Methicillin-susceptible Staphylococcus aureus pneumonia and required tracheostomy and PEG placement  He also had a seizure on November 17, 2014 and was started on Keppra  He was eventually transferred to [address] [place] [address] on November 28, 2014 and weaning was successful  He did have urinary retention and was followed by Urology  He was also noted to be restless and anxious, and was started on Zyprexa  The patient was determined to be stable for transfer to the acute rehabilitation unit on December 19, 2014  He was seen by Dr Venessa Webb, and local wound care did resolve the infection  He was also seen by Dr Jami Coates secondary to urinary retention  He did undergo clean intermittent catheterization and eventually urinary retention did resolve  He was also followed by Dr Jason Pruitt secondary to atrial fibrillation and Coumadin management  The patient was very sensitive to Coumadin and required multiple medication modifications  In regards to his progression in therapy, the patient required moderate assistance with upper body activities of daily living, and cognition   He required total assistance for lower body activities of daily living, toileting, bowel and bladder management, transfers, and toilet transfers  He required maximum assist with bathing and to propel a wheelchair 150 feet  Due to the fact that the patient would require more assistance than his family could provide at home, it was determined that he would benefit from further rehabilitation at a subacute level  He was, therefore, discharged on January 12, 2015, to [facility]  PRESENT STATUS:  He continues to do well  He has gotten significant benefit from Botox injection  He is moving his right upper extremity and right lower extremity much more fluidly at this point as per wife  He is continuing home physical therapy  He is to get a new knee ankle foot orthosis  He is ambulating with a rolling walker up to 20-30 feet  He is performing stand pivot transfers at contact guard assist level with help of wife  Since last visit he has developed vertigo for which he is taking meclizine  He is also started on Remeron for sleep  His cognition continues to improve  No other medical changes since last visit  His left knee continues to bother him  He has had knee brace at this point which is of minimal benefit  He has not regained any strength to left upper extremity or the left lower extremity  He continues to remain flaccid on the left side  He was recently hospitalized with gallstone pancreatitis  It resolved nonsurgically  He is currently receiving home therapies  He remains incontinent of bowel and bladder  Bladder incontinence getting better  He uses urinal 70-80 of the time for urination  He remains incontinent of bowel  Wife has created a schedule for him by which he is having a bowel movement every 3 days  He does seem to have some volitional control over bowel as well  As per wife he has been making steady gains  His verbal output has improved significantly  He makes his needs known very consistently  he has not had any episodes of seizures  He has not had any episodes of agitation  He is still very flaccid on the left side  He listens to his wife much more consistently  Review of SystemsPhysical Medicine Rehab ROS:   Constitutional: recent weight loss  HEENT:  no sinus problems, not feeling congested, no blurred vision, no dryness of the eyes, no eye pain, no hearing loss, no tinnitus, no mouth sores, no sore throat, no hoarseness, no dysphagia, no masses, no bleeding  Cardiovascular:  no chest pain or pressure, no palpitations present, the heart rate was not rapid or irregular, no swelling in the arms or legs, no poor circulation  Respiratory:  no unusual or persistant cough, no shortness of breath with or without exertion  Gastrointestinal:  no nausea, no vomiting, no diarrhea, no abdominal pain, no changes in bowel habits, no melena, no loss of bowel control  Genitourinary:  no incontinence, no feelings of urinary urgency, no increase in frequency, no urinary hesitancy, no dysuria, no hematuria  Musculoskeletal:  no arthralgias, no myalgias, no immobility or loss of function, no head/neck/back pain, no pain while walking  Integumentary  no masses, no rash, no skin lesions, no livedo reticularis  Psychiatric:  no anxiety, no depression, no mood swings, no psychiatric hospitalizations, no sleep problems  Endocrine  no unusual weight loss or gain, no excessive urination, no excessive thirst, no hair loss or gain, no hot or cold intolerance, no menstrual period change or irregularity, no loss of sexual ability or drive, no erection difficulty, no nipple discharge  Hematologic/Lymphatic:  no unusual bleeding, no tendency for easy bruising, no clotting skin or lumps  Neurological General: headache  Neurological Mental Status:  no confusion, no mood swings, no alteration or loss of consciousness, no difficulty expressing/understanding speech, no memory problems  Neurological Cranial Nerves: vertigo or dizziness and slurred speech     Neurological Motor findings include:  no tremor, no twitching, no cramping(pre/post exercise), no atrophy  Neurological Coordination:  no unsteadiness, no vertigo or dizziness, no clumsiness, no problems reaching for objects  Neurological Sensory:  no numbness, no pain, no tingling, does not fall when eyes closed or taking a shower  Neurological Gait:  no difficulty walking, not falling to one side, no sensation of being pushed, has not had falls  ROS Reviewed:   ROS reviewed  Active Problems    1  Bowel and bladder incontinence (788 30,787 60) (R32,R15 9)   2  Cardiomyopathy, nonischemic (425 4) (I42 9)   3  CHF (congestive heart failure) (428 0) (I50 9)   4  CVA (cerebral infarction) (434 91) (I63 9)   5  Dysphagia (787 20) (R13 10)   6  Dystonia (781 0) (G24 9)   7  Global aphasia (784 3) (R47 02)   8  Left knee pain (719 46) (M25 562)   9  Mural thrombus of heart (410 90) (I21 3)   10  Neurogenic bladder (596 54) (N31 9)   11  Neurogenic bowel (564 81) (K59 2)   12  PAF (paroxysmal atrial fibrillation) (427 31) (I48 0)   13  Primary osteoarthritis of left knee (715 16) (M17 12)   14  Seizure   15  Shoulder subluxation, left (831 00) (S43 002A)   16  Stroke, embolic (571 98) (O87 0)    Past Medical History    1  H/O prostate cancer (V10 46) (Z85 46)   2  History of atrial fibrillation (V12 59) (Z86 79)   3  History of hypertension (V12 59) (Z86 79)   4  History of Takotsubo cardiomyopathy (429 83) (I51 81)  Active Problems And Past Medical History Reviewed: The active problems and past medical history were reviewed and updated today  Surgical History    1  History of Total Knee Arthroplasty  Surgical History Reviewed: The surgical history was reviewed and updated today  Family History    1  No pertinent family history    2  Family history of Aneurysm  Family History Reviewed: The family history was reviewed and updated today  Social History    ·    · Never a smoker   · Work history  Social History Reviewed:  The social history was reviewed and updated today  Current Meds   1  Acetaminophen 8 Hour 650 MG Oral Tablet; TAKE 1 TABLET 3-4 TIMES DAILY AS   NEEDED; Therapy: (Recorded:02Oct2015) to Recorded   2  Amantadine HCl - 100 MG Oral Capsule; TAKE ONE CAPSULE BY MOUTH TWICE A DAY    DX: Embolic Stroke; Last Rx:[id number] Ordered   3  Atorvastatin Calcium 80 MG Oral Tablet; TAKE 1 TABLET DAILY; Therapy: (Recorded:47Zkx0276) to Recorded   4  Botox 100 UNIT Injection Solution Reconstituted; every 3 months; Therapy: 27Apr2015 to (Last Rx:27Apr2015) Ordered   5  Dulcolax Stool Softener CAPS; TAKE CAPSULE  PRN; Therapy: (Sharon Burleson) to Recorded   6  Escitalopram Oxalate 10 MG Oral Tablet; TAKE 1 TABLET DAILY; Therapy: (Sharon Burleson) to Recorded   7  Hydrocortisone CREA; APPLY ONCE DAILY AS NEEDED; Therapy: (Sharon Burleson) to Recorded   8  LevETIRAcetam 100 MG/ML Oral Solution; Take 1 teaspoonful by mouth at 9am and 1   teaspoonful by mouth at 9pm;   Therapy: (Recorded:49Ycz6989) to Recorded   9  Lidocaine 5 % External Patch; APPLY AS DIRECTED; Therapy: [phone]) to Recorded   10  Methocarbamol 750 MG Oral Tablet; TAKE 1 TABLET 3 TIMES DAILY; Last Rx:29Oct2015    Ordered   11  Milk of Magnesia SUSP; USE AS DIRECTED; Therapy: (Sharon Burleson) to Recorded   12  MiraLax PACK Recorded   13  Nystatin 504266 UNIT/GM External Powder; Use as direcred as needed; Therapy: (Recorded:28Hxt6305) to Recorded   14  Senna-S TABS; TAKE 1 OR 2 TABLETS DAILY TO PREVENT CONSTIPATION; Therapy: (Sharon Burleson) to Recorded   15  TraZODone HCl - 50 MG Oral Tablet; Pt states 75mg tablet daily; Therapy: (Recorded:17Rdt0061) to Recorded   16  Warfarin Sodium 1 MG Oral Tablet; TAKE 1 TABLET DAILY; Therapy: (Recorded:47Zyk6997) to Recorded   17  Warfarin Sodium 2 MG Oral Tablet; TAKE 1 TABLET DAILY; Therapy: (Recorded:22Ppo3963) to Recorded  Medication List Reviewed:    The medication list was reviewed and updated today  Allergies    1  No Known Drug Allergies    Vitals  Signs [Data Includes: Current Encounter]   Recorded: 87HHH3937 01:30PM   Heart Rate: 57  Respiration: 14  Systolic: 833  Diastolic: 60    Physical Exam    Constitutional   General appearance: Abnormal   improved verbal output, dysarthric - inconsistent yes/no at times  Head and Face   Head and face: Atraumatic on inspection  Facial strength normal    Eyes   Ophthalmoscopic examination: Abnormal   right gaze preference; left neglect  Neck   Neck and thyroid: Abnormal   minimal cervical dystonia noted  Pulmonary   Respiratory effort: Lungs are clear bilaterally    Cardiovascular   Peripheral vascular exam: Normal pulses throughout, no tenderness, erythema or swelling  Abdomen: Positive bowel sounds  Musculoskeletal   Gait and station: Abnormal   not tested, in WC  no contractures, severe 3 fingerbreadth left shoulder subluxation  FROM in all joints in RUE, RLE, LUE and LLE; Able to dorsiflex both ankles to neutral    Muscle strength: Abnormal   severe left shoulder girdle muscle atrophy (deltoids, supraspinatus, infraspinatus and rhomboids); RLE: 5/5 all throughout; 0/5 on LLE - flaccid with no tone; RUE: 5/5 all throughout; LUE: flaccid 0/5 all throughout, some shoulder shrug noted on left; LLE: quads 1/5; hamstrings 1/5  Muscle tone: Abnormal   moderate elbow and wrist flexion spastic dystonia (mainly affecting biceps, wrist flexors and pectoralis); moderate right lower extremity extension tone (mainly affecting hamstrings and gastrocs); flaccid with no tone on LUE and LLE  Involuntary movements: None observed       Inspection/palpation of joints, bones, and muscles: Abnormal      Range of motion: Abnormal      Stability: Abnormal       Skin   Skin: skin warm and dry with no evidence of unusual rashes or suspicious lesions   Neurologic   Mental Status: Abnormal  knows his name and wife, knew the year, thgt this was [de-identified], but not aware of surroundings, able to repeat me, non-fluent, significant dysarticulation, follows 2-3 step commads, severe dysarthria with verbalization, able to spell watch, responds to wife much better  Reflexes: Abnormal   1+ BUE and BLE, symmetric  Coordination: Abnormal     Sensation: Abnormal   inconsistent responses  Cortical function: Abnormal   severely decreased attention span  Judgment and insight: Abnormal   poor insight, slightly impulsive (attempted to get out of chair during interview)  Future Appointments    Date/Time Provider Specialty Site   02/08/2016 03:40 PM Braden Wu DO Cardiology [facility]   05/05/2016 02:00 PM Altagracia Landrum DO Neurology [facility]   04/07/2016 02:30 PM GIANFRANCO Lennon   Neurology [facility]     Signatures   Electronically signed by : Elza Werner DO; Jan 29 2016  9:58PM EST                       (Author)

## 2018-02-08 DIAGNOSIS — C61 PROSTATE CANCER (HCC): Primary | ICD-10-CM

## 2018-02-08 NOTE — TELEPHONE ENCOUNTER
Pt  Needs prescription faxed to Taiwo Perez, care manager with the [employer] [facility]  She needs the script plus the reason why Mr Maria Del Carmen Ortega is on the medication   Her fax is: [phone]

## 2018-02-13 ENCOUNTER — TELEPHONE (OUTPATIENT)
Dept: NEUROLOGY | Facility: CLINIC | Age: 73
End: 2018-02-13

## 2018-02-13 NOTE — LETTER
To Whom it May Concern,              Giovana Kay  1945 is currently being treated at [facility]  He is prescribed methocarbamol 750 mg TID for spasticity post CVA  Please contact our office with any questions or concerns at [phone]      Sincerely,   Rosalinda Ricketts PA-C

## 2018-02-13 NOTE — TELEPHONE ENCOUNTER
I last saw him in May and his Robaxin was increased to QID  Has he not been taking that? Looks like he may have missed his 6 month follow up (I checked [employer] and there were a couple of cancelled appts)    If he has not been taking the Robaxin, we can re-start it

## 2018-02-13 NOTE — TELEPHONE ENCOUNTER
pt's wife called and states that she has been walking the pt with the help of an aide  she states that pt is very stiff and rigid  more in the morning  he has taken robaxin in the past   he has been off this for a couple months  she is asking if there is anything that you can recommend      [phone]

## 2018-02-16 ENCOUNTER — OFFICE VISIT (OUTPATIENT)
Dept: CARDIOLOGY CLINIC | Facility: CLINIC | Age: 73
End: 2018-02-16
Payer: MEDICARE

## 2018-02-16 VITALS — OXYGEN SATURATION: 96 % | SYSTOLIC BLOOD PRESSURE: 116 MMHG | DIASTOLIC BLOOD PRESSURE: 58 MMHG | HEART RATE: 62 BPM

## 2018-02-16 DIAGNOSIS — I48.0 PAROXYSMAL ATRIAL FIBRILLATION (HCC): Primary | ICD-10-CM

## 2018-02-16 DIAGNOSIS — I51.81 STRESS-INDUCED CARDIOMYOPATHY: ICD-10-CM

## 2018-02-16 PROBLEM — Z86.79 H/O CARDIOMYOPATHY: Status: ACTIVE | Noted: 2018-02-16

## 2018-02-16 PROBLEM — I61.9 CVA (CEREBROVASCULAR ACCIDENT DUE TO INTRACEREBRAL HEMORRHAGE) (HCC): Status: ACTIVE | Noted: 2018-02-16

## 2018-02-16 PROCEDURE — 99214 OFFICE O/P EST MOD 30 MIN: CPT | Performed by: INTERNAL MEDICINE

## 2018-02-16 RX ORDER — ATORVASTATIN CALCIUM 80 MG/1
1 TABLET, FILM COATED ORAL DAILY
COMMUNITY

## 2018-02-16 RX ORDER — BIOTIN 1 MG
2 TABLET ORAL DAILY
COMMUNITY
Start: 2017-01-26

## 2018-02-16 RX ORDER — AMOXICILLIN 250 MG
1-2 CAPSULE ORAL DAILY
COMMUNITY

## 2018-02-16 RX ORDER — LEUPROLIDE ACETATE 45 MG
KIT SUBCUTANEOUS
COMMUNITY

## 2018-02-16 RX ORDER — DOCUSATE SODIUM 100 MG/1
CAPSULE, LIQUID FILLED ORAL
COMMUNITY
End: 2019-07-01 | Stop reason: ALTCHOICE

## 2018-02-16 RX ORDER — ASPIRIN 81 MG/1
1 TABLET ORAL DAILY
COMMUNITY
Start: 2017-01-26

## 2018-02-16 RX ORDER — UBIDECARENONE 75 MG
2 CAPSULE ORAL
COMMUNITY

## 2018-02-16 RX ORDER — MIRTAZAPINE 15 MG/1
2 TABLET, FILM COATED ORAL
COMMUNITY
End: 2018-02-16 | Stop reason: SDUPTHER

## 2018-02-16 RX ORDER — BICALUTAMIDE 50 MG/1
1 TABLET, FILM COATED ORAL DAILY
COMMUNITY
Start: 2016-02-08 | End: 2019-08-21 | Stop reason: SDUPTHER

## 2018-02-16 RX ORDER — DIAPER,BRIEF,INFANT-TODD,DISP
EACH MISCELLANEOUS DAILY PRN
COMMUNITY

## 2018-02-16 RX ORDER — POLYETHYLENE GLYCOL 3350 17 G/17G
POWDER, FOR SOLUTION ORAL
COMMUNITY

## 2018-02-16 RX ORDER — ESCITALOPRAM OXALATE 10 MG/1
1 TABLET ORAL DAILY
COMMUNITY
End: 2019-04-08 | Stop reason: SDUPTHER

## 2018-02-16 RX ORDER — LEVETIRACETAM 100 MG/ML
1 SOLUTION ORAL 2 TIMES DAILY
COMMUNITY

## 2018-02-16 RX ORDER — MIRTAZAPINE 30 MG/1
TABLET, FILM COATED ORAL
Refills: 3 | COMMUNITY
Start: 2018-01-23 | End: 2018-07-05 | Stop reason: SDUPTHER

## 2018-02-16 NOTE — PROGRESS NOTES
Cardiology Outpatient Progress Note - Hernan Ortiz  74 y o  male MRN: [id number]    @ Encounter: [id number]      There are no active problems to display for this patient  Assessment:  1  Transient cardiomyopathy- Takotsubo pattern at time of CVA with a pedunculated apical mural thrombus likely- on coumadin; EF 10 days later normalized- followup echos last two years have shown EF 55%2   2  PAF- during his hospitalization; off coumadin over a year- agree he was to be on aspirin  3  CVA- likely cardioembolic, to right MCA  Left hemiparesis  S/p trach and peg- not currently using peg for nutritiona  global aphasiab  seizure disorder  4  prostate CA- elevated PSA, added metformin per recent studies    TODAY'S PLAN:    HPI:    Mr Jg Tamez is a 15-year-old gentleman that's all on his in the hospital after he unfortunately suffered a right middle cerebral artery CVA which was felt to be embolic  This was on November 8, 2014  He was given IV TPA with minimal improvement in his symptoms  He was also found to have hemorrhagic conversion of stroke after TPA  I became involved after his echocardiogram had demonstrated an ejection fraction of around 30% which looks like a Takotsubo pattern  He had a apical thombus or what appeared to be an apical thrombus  10 days later his echo was repeated and the two apical lesions were still visualized but his EF had improved to 50-55%  He had some evidence of PAF during his hospitalization as well which may have been the source of the CVA  Cathmegan Weems He has not had any bleeding on coumadin  He had a follow up echo in June showing normal EF with grade 2 diastolic dysfunction  No apical thrombus  On follow up today he is doing even better, but occasionally gets vertigo with residual left temporal discomfort  He looks better every time I see him  Just getting over a bladder infection  No more dizziness  WE did an echo 6/8 EF: 55%, no valve disease  Off coumadin 7 months now, on aspirin   He is now occasionally walking with a walker which is great  Aug 1 follow up: June 2017 echo EF: 55%  No new symptoms        Interval History:   No new cardiac complaints    No past medical history on file  Review of Systems - Negative except no new cardiac symptoms    Allergies not on file        Current Outpatient Prescriptions:     metFORMIN (GLUCOPHAGE) 500 mg tablet, Take 1 tablet (500 mg total) by mouth 2 (two) times a day with meals, Disp: 60 tablet, Rfl: 10     Current Outpatient Prescriptions:     aspirin (ECOTRIN LOW STRENGTH) 81 mg EC tablet, Take 1 tablet by mouth daily, Disp: , Rfl:     atorvastatin (LIPITOR) 80 mg tablet, Take 1 tablet by mouth daily, Disp: , Rfl:     bicalutamide (CASODEX) 50 mg tablet, Take 1 tablet by mouth daily, Disp: , Rfl:     Cholecalciferol (VITAMIN D3) 1000 units CAPS, Take 2 tablets by mouth daily, Disp: , Rfl:     cyanocobalamin (VITAMIN B-12) 100 mcg tablet, Take 2 capsules by mouth, Disp: , Rfl:     denosumab (PROLIA) 60 mg/mL, Inject under the skin every 6 (six) months, Disp: , Rfl:     docusate sodium (DULCOLAX) 100 mg capsule, Take by mouth, Disp: , Rfl:     escitalopram (LEXAPRO) 10 mg tablet, Take 1 tablet by mouth daily, Disp: , Rfl:     hydrocortisone 0 5 % cream, Insert into the rectum daily as needed, Disp: , Rfl:     leuprolide (ELIGARD) 45 MG injection, Inject under the skin, Disp: , Rfl:     levETIRAcetam (KEPPRA) 100 mg/mL oral solution, Take 1 mL by mouth 2 (two) times a day, Disp: , Rfl:     metFORMIN (GLUCOPHAGE) 500 mg tablet, Take 1 tablet (500 mg total) by mouth 2 (two) times a day with meals, Disp: 60 tablet, Rfl: 10    mirtazapine (REMERON) 30 mg tablet, TK 1 T PO QD BEFORE BEDTIME, Disp: , Rfl: 3    polyethylene glycol (MIRALAX) 17 g packet, Take by mouth, Disp: , Rfl:     senna-docusate sodium (SENNA S) 8 6-50 mg per tablet, Take 1-2 tablets by mouth Daily, Disp: , Rfl:     Social History     Social History    Marital status: /Civil Union     Spouse name: N/A    Number of children: N/A    Years of education: N/A     Occupational History    Not on file  Social History Main Topics    Smoking status: Not on file    Smokeless tobacco: Not on file    Alcohol use Not on file    Drug use: Unknown    Sexual activity: Not on file     Other Topics Concern    Not on file     Social History Narrative    No narrative on file       No family history on file  Physical Exam:    Vitals: There were no vitals taken for this visit  , There is no height or weight on file to calculate BMI ,   Wt Readings from Last 3 Encounters:   04/26/16 63 5 kg (140 lb)   03/21/16 63 5 kg (140 lb)   03/15/16 63 5 kg (140 lb)         Physical Exam:    GEN: Sylvain Katz   appears well, alert and oriented x 3, pleasant and cooperative   HEENT: pupils equal, round, and reactive to light; extraocular muscles intact  NECK: supple, no carotid bruits   HEART: regular rhythm, normal S1 and S2, no murmurs, clicks, gallops or rubs, JVP is    LUNGS: clear to auscultation bilaterally; no wheezes, rales, or rhonchi   ABDOMEN: normal bowel sounds, soft, no tenderness, no distention  EXTREMITIES: peripheral pulses normal; no clubbing, cyanosis, or edema  NEURO: no focal findings   SKIN: normal without suspicious lesions on exposed skin    Labs & Results:    Lab Results   Component Value Date    GLUCOSE 82 12/18/2015    CALCIUM 8 8 12/18/2015     12/18/2015    K 3 3 (L) 12/18/2015    CO2 27 12/18/2015     12/18/2015    BUN 9 12/18/2015    CREATININE 0 33 (L) 12/18/2015     Lab Results   Component Value Date    WBC 4 93 12/17/2015    HGB 13 8 12/17/2015    HCT 43 0 12/17/2015    MCV 95 12/17/2015     12/17/2015     No results found for: BNP   Lab Results   Component Value Date    CHOL 157 11/09/2014     Lab Results   Component Value Date    HDL 59 11/09/2014     Lab Results   Component Value Date    LDLCALC 85 11/09/2014     Lab Results   Component Value Date TRIG 67 11/09/2014     No components found for: CHOLHDL    Echocardiogram 6/6/17  LVEF: 55%  LVIDd:  RV:  MR:  PASP: normal  RVOT:   Other:    EKG personally reviewed by Pineda Bradley, DO  Counseling / Coordination of Care  Total floor / unit time spent today 25 minutes  Greater than 50% of total time was spent with the patient and / or family counseling and / or coordination of care  A description of the counseling / coordination of care: 15  Thank you for the opportunity to participate in the care of this patient    MAGALYS RODRIGUEZ [address]

## 2018-02-16 NOTE — TELEPHONE ENCOUNTER
I called and s/w pt wife and she states pt has been off of robaxin since may  She states he is willing to restart robaxin  He has pills at home of 750mg strength with directions to take 1 tid  Should he restart this?

## 2018-02-19 NOTE — TELEPHONE ENCOUNTER
Pt's wife made aware and verbalized understanding  She states that she has enough Robaxin 750 mg and  doesn't need new script at this time

## 2018-02-19 NOTE — TELEPHONE ENCOUNTER
Yes, can re-start  Would start once a day x 3 days, then BID x 3 days, then TID  After that, can see how he is doing on TID dosing  Do they need a new Rx?

## 2018-04-05 ENCOUNTER — OFFICE VISIT (OUTPATIENT)
Dept: NEUROLOGY | Facility: CLINIC | Age: 73
End: 2018-04-05
Payer: MEDICARE

## 2018-04-05 VITALS — SYSTOLIC BLOOD PRESSURE: 122 MMHG | RESPIRATION RATE: 14 BRPM | DIASTOLIC BLOOD PRESSURE: 60 MMHG | HEART RATE: 57 BPM

## 2018-04-05 DIAGNOSIS — G24.9 DYSTONIA: ICD-10-CM

## 2018-04-05 DIAGNOSIS — Z86.73 HISTORY OF CVA (CEREBROVASCULAR ACCIDENT): Primary | ICD-10-CM

## 2018-04-05 DIAGNOSIS — G40.909 SEIZURE DISORDER (HCC): ICD-10-CM

## 2018-04-05 PROCEDURE — 99214 OFFICE O/P EST MOD 30 MIN: CPT | Performed by: PSYCHIATRY & NEUROLOGY

## 2018-04-05 RX ORDER — AMOXICILLIN 500 MG/1
CAPSULE ORAL
Refills: 0 | COMMUNITY
Start: 2018-03-21 | End: 2019-07-01 | Stop reason: ALTCHOICE

## 2018-04-05 NOTE — ASSESSMENT & PLAN NOTE
Pt without any breakthru seizures  Tolerates the keppra well  Pts biggest issue remains left knee pain  Pt has been seen by muliple specialists including pain mx and ortho  No success to date with pain meds ollie narcotics as change in mental status  Per pts wife, pt did the best pain wise with tramadol  This was discontinued due to question of efficacy of the keppra with the tramadol  I personally called pts pharmacist who looked at 2 references and no clear interaction noted  Pt is being next week by his [place] doc and wife will address with doctor there regarding risk vs benefit  Also pt and wife willing to accept a lowered sz threshold to gain him pain relief  Pt has had good sz control ie only on sz to date

## 2018-04-05 NOTE — ASSESSMENT & PLAN NOTE
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 No hx of recent trauma/injury to exacerbate injury,     Review of patient's allergies indicates:  No Known Allergies  HPI Comments: Patient has a right BK amputation from several years ago.  He has lost some weight and now his prosthetic doesn't fit improperly.  He has several areas of prominence that are irritated and rubbed raw.  He is also complaining of some neck pain    Patient is a 36 y.o. male presenting with the following complaint: leg pain. The history is provided by the patient.   Leg Pain    Injury mechanism: rula's right lower extremity is raw from hispoorly fitting prosthetic. The incident occurred several weeks ago. The pain is present in the right leg. The pain is at a severity of 5/10. The pain has been constant since onset. Pertinent negatives include no numbness, no inability to bear weight, no loss of motion, no muscle weakness, no loss of sensation and no tingling. He reports no foreign bodies present. The symptoms are aggravated by bearing weight.     Past Medical History   Diagnosis Date    Amputation of lower limb      left below the knee    Compartment syndrome of left lower extremity     Compartment syndrome of right lower extremity     Depression 3/18/2016    DVT (deep venous thrombosis)     Encounter for cholesteral screening for cardiovascular disease     Hypertension     Tachycardia      Past Medical History Pertinent Negatives   Diagnosis Date Noted    Diabetes mellitus 4/18/2015    GERD (gastroesophageal reflux disease) 4/18/2015     Past Surgical History   Procedure Laterality Date    Knee surgery      " Pt remains stable from cva standpoint  No change in exam  Pt remains on baby asa  No new sxs currently Amputation, lower limb  2012     right BKA    Tibial fasciotomy Right 12/08/2015     History reviewed. No pertinent family history.  Social History   Substance Use Topics    Smoking status: Former Smoker     Packs/day: 1.00     Types: Cigarettes    Smokeless tobacco: None    Alcohol use Yes      Comment: occas     Review of Systems   Musculoskeletal: Positive for neck pain.   Neurological: Negative for tingling and numbness.   All other systems reviewed and are negative.      Physical Exam   Initial Vitals   BP Pulse Resp Temp SpO2   02/09/17 0127 02/09/17 0127 02/09/17 0127 02/09/17 0127 02/09/17 0127   161/111 94 20 98 °F (36.7 °C) 100 %     Physical Exam    Nursing note and vitals reviewed.  Constitutional: He appears well-developed and well-nourished.   HENT:   Head: Normocephalic and atraumatic.   Eyes: EOM are normal.   Neck: Normal range of motion. Neck supple.   Cardiovascular: Normal rate, regular rhythm, normal heart sounds and intact distal pulses.   Pulmonary/Chest: Breath sounds normal.   Abdominal: Soft.   Musculoskeletal: Normal range of motion.        Legs:  Neurological: He is alert and oriented to person, place, and time.   Skin: Skin is warm and dry.   Psychiatric: He has a normal mood and affect. His behavior is normal. Judgment and thought content normal.         ED Course   Procedures  Labs Reviewed - No data to display          Medical Decision Making:   Clinical Tests:   Radiological Study: Ordered and Reviewed                   ED Course     Clinical Impression:   The encounter diagnosis was Stump pain.    Disposition:   Disposition: Discharged  Condition: Stable       Rashmi Saldaña MD  02/09/17 0213

## 2018-04-05 NOTE — PATIENT INSTRUCTIONS
CVA (cerebrovascular accident due to intracerebral hemorrhage) (HCC)  Pt remains stable from cva standpoint  No change in exam  Pt remains on baby asa  No new sxs currently    Seizure disorder ([address] )  Pt without any breakthru seizures  Tolerates the keppra well  Pts biggest issue remains left knee pain  Pt has been seen by muliple specialists including pain mx and ortho  No success to date with pain meds ollie narcotics as change in mental status  Per pts wife, pt did the best pain wise with tramadol  This was discontinued due to question of efficacy of the keppra with the tramadol  I personally called pts pharmacist who looked at 2 references and no clear interaction noted  [facility] and wife will address with doctor there regarding risk vs benefit  Also pt and wife willing to accept a lowered sz threshold to gain him pain relief  Pt has had good sz control ie only on sz to date

## 2018-04-05 NOTE — PROGRESS NOTES
Patient ID: Staci Zhang  is a 74 y o  male  Assessment/Plan:    CVA (cerebrovascular accident due to intracerebral hemorrhage) ([facility])  Pt remains stable from cva standpoint  No change in exam  Pt remains on baby asa  No new sxs currently    Seizure disorder ([facility] [address] )  Pt without any breakthru seizures  Tolerates the keppra well  Pts biggest issue remains left knee pain  Pt has been seen by muliple specialists including pain mx and ortho  No success to date with pain meds ollie narcotics as change in mental status  Per pts wife, pt did the best pain wise with tramadol  This was discontinued due to question of efficacy of the keppra with the tramadol  I personally called pts pharmacist who looked at 2 references and no clear interaction noted  Pt is being next [facility] and wife will address with doctor there regarding risk vs benefit  Also pt and wife willing to accept a lowered sz threshold to gain him pain relief  Pt has had good sz control ie only on sz to date       Diagnoses and all orders for this visit:    History of CVA (cerebrovascular accident)    Seizure disorder ([facility] [address] )    Dystonia    Other orders  -     amoxicillin (AMOXIL) 500 mg capsule; DNC  -     diclofenac sodium (VOLTAREN) 1 %; AMA TOPICALLY BID AA           Subjective:    HPI  HPI: Patient is a 74year old male who presents for neurological follow up, last seen in May 2017  Patient with PMH of agent orange exposure, prostate cancer followed by Dr Harvey Salas, paroxysmal AFib, and CVA in Nov 2014 in right MCA, followed by second embolic event within a few weeks  Patient had a clot found related to apical thrombus  Patient was given TPA for first stroke but then had a grand mal seizure and found to have another CVA on the opposite side  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 with severe dysarthria  He had some PAF during his hospitalization at this time  He required a PEG and trach due to his condition   He was in the [facility] for about 3 weeks and started on Coumadin  His wife has been an excellent caregiver and he had outpatient services at [facility]  He was started on Keppra for his seizure and no further seizure activity noted   Patient was hospitalized in Sept 2015 for gallstone pancreatitis, which did not require surgery  CT head Dec 2015 showed chronic infarcts, no acute abnormalities  Today, patient reports he is doing well  His wife continues to be happy with his progress  He has been walking with the walker and the help of his aide and wife  The biggest issue remains his left knee osteoarthritis  He has had numerous injections without success, has seen ortho and not a surgical candidate  Patient eating many different varieties of food, wife is thinning out his food  No choking or coughing with eating  He saw cardiology in June 2016, and apparently was taken off his Coumadin  According to the note, cardiologist felt his PAF that was only seen in the hospital in 2014 was isolated and no evidence of AFib since then  He remains on ASA 81mg  Pt cont on this med  No new sxs currently  No breakthru sz activity  Pt talia his keppra well  Extended appt due to issues ongoing with pain in the left knee and no relief to pt  This is the biggest issue and despite seeing ortho and pain mx, pt has had no relief with narcotics or local injections  Per wife, pt is in need of a complete knee replacement ie bone on bone, but not medically able to have  Pt without any breakthru seizures  He tolerates the keppra well  Pts biggest issue remais left knee pain  Pt has been walking and standing with wife and caregiver team but limited due to weakness and pain  Pt has been seen by muliple specialists including pain mx and ortho  No success to date with pain meds ollie narcotics as change in mental status  Per pts wife, pt did the best pain wise with tramadol  This was discontinued due to question of efficacy of the keppra with the tramadol    I personally called pts pharmacist who looked at 2 references and no clear interaction noted  [facility] and wife will address with doctor there regarding risk vs benefit  Also pt and wife willing to accept a lowered sz threshold to gain him pain relief  Pt has had good sz control ie only on sz to date  He denies any new symptoms, continues to improve  He has a very positive attitude  He  No breakthrough seizures reported, no zoning, LOC  He continues with some spasticity and is currently taking Robaxin and his pcp just gave rx for slightly higher dose  Wife will be watchful for any decrease in tone related to med ollie with trying to get him up out of chair  Pt now on metformin from urology  Pt following up with urology on regular basis  Total time spent today 35 minutes  Greater than 50% of total time was spent with the patient and / or family counseling and / or coordination of care      The following portions of the patient's history were reviewed and updated as appropriate: allergies, current medications, past family history, past medical history, past social history, past surgical history and problem list          Objective:    Blood pressure 122/60, pulse 57, resp  rate 14  Physical Exam   Constitutional: He appears well-developed and well-nourished  Eyes: EOM are normal  Pupils are equal, round, and reactive to light  Cardiovascular: Intact distal pulses  Neurological Exam    Mental Status  The patient is alert and oriented to person, place, time, and situation  His recent and remote memory are normal  He has dysarthria  He has normal attention span and concentration  He has a normal fund of knowledge      Cranial Nerves    CN II: The patient's visual acuity and visual fields are normal   CN III, IV, VI: The patient's pupils are equally round and reactive to light and ocular movements are normal   CN V: The patient has normal facial sensation  CN VII:  The patient has symmetric facial movement  CN VIII:  The patient's hearing is normal   CN IX, X: The patient has symmetric palate movement and normal gag reflex  CN XI: The patient's shoulder shrug strength is normal   CN XII: The patient's tongue is midline without atrophy or fasciculations  Cn 2-12 nl except for inferior left quadranopsia     Motor   His overall muscle tone is increased throughout  Specifically, the tone is normal in the right arm, increased in the left arm, normal in the right leg, increased in the left leg  Left hemiparesis arm greater than leg     Sensory    Dec vib sandi lowers     Reflexes  Increased left greater than right     Gait and Coordination   He has normal right finger to nose and abnormal left finger to nose coordination  In wheelchair  Helping with standing and pivoting with some walking with assistance         ROS:    Review of Systems   Constitutional: Positive for unexpected weight change  Negative for appetite change and fever  HENT: Negative  Negative for hearing loss, tinnitus, trouble swallowing and voice change  Eyes: Negative  Negative for photophobia and pain  Respiratory: Negative  Negative for shortness of breath  Cardiovascular: Negative  Negative for palpitations  Gastrointestinal: Positive for diarrhea  Negative for nausea and vomiting  Endocrine: Negative  Negative for cold intolerance and heat intolerance  Genitourinary: Positive for frequency  Negative for dysuria and urgency  Musculoskeletal: Positive for arthralgias  Negative for myalgias and neck pain  Skin: Negative  Negative for rash  Neurological: Negative  Negative for dizziness, tremors, seizures, syncope, facial asymmetry, speech difficulty, weakness, light-headedness, numbness and headaches  Hematological: Negative  Does not bruise/bleed easily  Psychiatric/Behavioral: Positive for sleep disturbance  Negative for confusion and hallucinations

## 2018-05-01 DIAGNOSIS — C61 PROSTATE CANCER (HCC): ICD-10-CM

## 2018-06-14 ENCOUNTER — OFFICE VISIT (OUTPATIENT)
Dept: UROLOGY | Facility: CLINIC | Age: 73
End: 2018-06-14
Payer: MEDICARE

## 2018-06-14 VITALS — HEART RATE: 60 BPM | DIASTOLIC BLOOD PRESSURE: 75 MMHG | SYSTOLIC BLOOD PRESSURE: 120 MMHG

## 2018-06-14 DIAGNOSIS — C61 MALIGNANT NEOPLASM OF PROSTATE (HCC): Primary | ICD-10-CM

## 2018-06-14 PROCEDURE — 99213 OFFICE O/P EST LOW 20 MIN: CPT | Performed by: PHYSICIAN ASSISTANT

## 2018-06-14 PROCEDURE — 96402 CHEMO HORMON ANTINEOPL SQ/IM: CPT | Performed by: UROLOGY

## 2018-06-14 RX ORDER — METHOCARBAMOL 750 MG/1
1000 TABLET, FILM COATED ORAL 2 TIMES DAILY
COMMUNITY
End: 2018-08-08 | Stop reason: SDUPTHER

## 2018-06-14 RX ORDER — OXYCODONE HYDROCHLORIDE 5 MG/1
5 CAPSULE ORAL 2 TIMES DAILY
COMMUNITY
End: 2018-08-02 | Stop reason: SDUPTHER

## 2018-06-14 NOTE — PROGRESS NOTES
UROLOGY PROGRESS NOTE   Patient Identifiers: Sana Dukes (MRN [id number])  Date of Service: 6/14/2018    Subjective:    74year old male with yeyo 6 prostate cancer  He had a prostatectomy in 2001 and his PSa was undetectable until 2007  Prostascint scan showed retroperitoneal lymph nodes and he was start ed on hormonal blockade which he remained on continuously  He hada catastrophic stroke in 2014 and has significant residual effects  His PSa laila to 12 7  I recently started him on bicalutamide and metformin and his PSa is stable at 6 8  Denies bone pain or weight loss  He is in a wheel chair and has a left hemiplegia  He received 45 mg Eligard today    Patient has  no complaints        Objective:     VITALS:    Vitals:    06/14/18 1133   BP: 120/75   Pulse: 60           LABS:  Lab Results   Component Value Date    HGB 13 8 12/17/2015    HCT 43 0 12/17/2015    WBC 4 93 12/17/2015     12/17/2015   ]    Lab Results   Component Value Date     12/18/2015    K 3 3 (L) 12/18/2015     12/18/2015    CO2 27 12/18/2015    BUN 9 12/18/2015    CREATININE 0 33 (L) 12/18/2015    CALCIUM 8 8 12/18/2015    GLUCOSE 82 12/18/2015   ]    INPATIENT MEDS:    Current Outpatient Prescriptions:     amoxicillin (AMOXIL) 500 mg capsule, DNC, Disp: , Rfl: 0    aspirin (ECOTRIN LOW STRENGTH) 81 mg EC tablet, Take 1 tablet by mouth daily, Disp: , Rfl:     atorvastatin (LIPITOR) 80 mg tablet, Take 1 tablet by mouth daily, Disp: , Rfl:     bicalutamide (CASODEX) 50 mg tablet, Take 1 tablet by mouth daily, Disp: , Rfl:     Cholecalciferol (VITAMIN D3) 1000 units CAPS, Take 2 tablets by mouth daily, Disp: , Rfl:     cyanocobalamin (VITAMIN B-12) 100 mcg tablet, Take 2 capsules by mouth, Disp: , Rfl:     denosumab (PROLIA) 60 mg/mL, Inject under the skin every 6 (six) months, Disp: , Rfl:     diclofenac sodium (VOLTAREN) 1 %, AMA TOPICALLY BID AA, Disp: , Rfl: 2    docusate sodium (DULCOLAX) 100 mg capsule, Take by mouth, Disp: , Rfl:     escitalopram (LEXAPRO) 10 mg tablet, Take 1 tablet by mouth daily, Disp: , Rfl:     hydrocortisone 0 5 % cream, Insert into the rectum daily as needed, Disp: , Rfl:     leuprolide (ELIGARD) 45 MG injection, Inject under the skin, Disp: , Rfl:     levETIRAcetam (KEPPRA) 100 mg/mL oral solution, Take 1 mL by mouth 2 (two) times a day, Disp: , Rfl:     metFORMIN (GLUCOPHAGE) 500 mg tablet, Take 1 tablet (500 mg total) by mouth 2 (two) times a day with meals for 90 days, Disp: 180 tablet, Rfl: 3    methocarbamol (ROBAXIN) 750 mg tablet, Take 500 mg by mouth 2 (two) times a day, Disp: , Rfl:     mirtazapine (REMERON) 30 mg tablet, TK 1 T PO QD BEFORE BEDTIME, Disp: , Rfl: 3    oxyCODONE (OXY-IR) 5 MG capsule, Take 5 mg by mouth 2 (two) times a day, Disp: , Rfl:     polyethylene glycol (MIRALAX) 17 g packet, Take by mouth, Disp: , Rfl:     senna-docusate sodium (SENNA S) 8 6-50 mg per tablet, Take 1-2 tablets by mouth Daily, Disp: , Rfl:   No current facility-administered medications for this visit  Physical Exam:   /75 (Patient Position: Sitting, Cuff Size: Adult)   Pulse 60   GEN: no acute distress    RESP: breathing comfortably with no accessory muscle use    ABD: soft, non-tender, non-distended   INCISION:    EXT: no significant peripheral edema   RADIOLOGY:   none     Assessment:   1   Prostate cancer     Plan:   - continue Eligard, bicalutamide and metformin  - follow up in 6 months with a PSA prior  - increase metformin if if PSA increases  -  -

## 2018-07-05 DIAGNOSIS — F33.9 RECURRENT MAJOR DEPRESSIVE DISORDER, REMISSION STATUS UNSPECIFIED (HCC): Primary | ICD-10-CM

## 2018-07-05 DIAGNOSIS — F33.9 RECURRENT MAJOR DEPRESSIVE DISORDER, REMISSION STATUS UNSPECIFIED (HCC): ICD-10-CM

## 2018-07-05 RX ORDER — MIRTAZAPINE 30 MG/1
TABLET, FILM COATED ORAL
Qty: 90 TABLET | Refills: 3 | Status: SHIPPED | OUTPATIENT
Start: 2018-07-05 | End: 2018-10-17 | Stop reason: SINTOL

## 2018-07-05 RX ORDER — MIRTAZAPINE 30 MG/1
TABLET, FILM COATED ORAL
Qty: 30 TABLET | Refills: 3 | Status: SHIPPED | OUTPATIENT
Start: 2018-07-05 | End: 2018-07-05 | Stop reason: SDUPTHER

## 2018-08-02 DIAGNOSIS — R52 PAIN: Primary | ICD-10-CM

## 2018-08-03 ENCOUNTER — OFFICE VISIT (OUTPATIENT)
Dept: CARDIOLOGY CLINIC | Facility: CLINIC | Age: 73
End: 2018-08-03
Payer: MEDICARE

## 2018-08-03 VITALS — OXYGEN SATURATION: 93 % | DIASTOLIC BLOOD PRESSURE: 54 MMHG | HEART RATE: 77 BPM | SYSTOLIC BLOOD PRESSURE: 116 MMHG

## 2018-08-03 DIAGNOSIS — I51.81 TAKOTSUBO CARDIOMYOPATHY: Primary | ICD-10-CM

## 2018-08-03 DIAGNOSIS — I48.0 PAF (PAROXYSMAL ATRIAL FIBRILLATION) (HCC): ICD-10-CM

## 2018-08-03 PROCEDURE — 99213 OFFICE O/P EST LOW 20 MIN: CPT | Performed by: INTERNAL MEDICINE

## 2018-08-03 RX ORDER — OXYCODONE HYDROCHLORIDE 5 MG/1
5 CAPSULE ORAL 2 TIMES DAILY
Qty: 30 CAPSULE | Refills: 0 | Status: SHIPPED | OUTPATIENT
Start: 2018-08-03 | End: 2018-08-17 | Stop reason: SDUPTHER

## 2018-08-03 NOTE — TELEPHONE ENCOUNTER
Pt wife called stating she is double checking on the oxycodone 5mg  She states she gives him two tablets one mid morning and one before bedtime  She only has enough until tomorrow  She would like this sent to [facility] on [facility]

## 2018-08-03 NOTE — PROGRESS NOTES
Cardiology Outpatient Progress Note - Teetee Burleson  74 y o  male MRN: [id number]    @ Encounter: [id number]      Patient Active Problem List    Diagnosis Date Noted    History of CVA (cerebrovascular accident) 04/05/2018    Seizure disorder ([facility] [address] ) 04/05/2018    Dystonia 04/05/2018    H/O cardiomyopathy 02/16/2018    Paroxysmal atrial fibrillation ([facility] ) 02/16/2018    CVA (cerebrovascular accident due to intracerebral hemorrhage) ([facility] ) 02/16/2018       Assessment:  # Transient cardiomyopathy- Takotsubo pattern at time of CVA with a pedunculated apical mural thrombus likely- on coumadin; EF 10 days later normalized- followup echos last two years have shown EF 55%  # PAF- during his hospitalization; off coumadin over a year- agree he was to be on aspirin  No reoccurences  # CVA- likely cardioembolic, to right MCA  Left hemiparesis  S/p trach and peg- not currently using peg for nutritiona  global aphasiab  seizure disorder  # prostate CA- elevated PSA, added metformin per recent studies    TODAY'S PLAN:    HPI:    Mr Jose Addison is a 49-year-old gentleman that's all on his in the hospital after he unfortunately suffered a right middle cerebral artery CVA which was felt to be embolic  This was on November 8, 2014  He was given IV TPA with minimal improvement in his symptoms  He was also found to have hemorrhagic conversion of stroke after TPA  I became involved after his echocardiogram had demonstrated an ejection fraction of around 30% which looks like a Takotsubo pattern  He had a apical thombus or what appeared to be an apical thrombus  10 days later his echo was repeated and the two apical lesions were still visualized but his EF had improved to 50-55%  He had some evidence of PAF during his hospitalization as well which may have been the source of the CVA  Jerryl Rm He has not had any bleeding on coumadin  He had a follow up echo in June showing normal EF with grade 2 diastolic dysfunction  No apical thrombus    On follow up today he is doing even better, but occasionally gets vertigo with residual left temporal discomfort  He looks better every time I see him  Just getting over a bladder infection  No more dizziness  WE did an echo 6/8 EF: 55%, no valve disease  Off coumadin 7 months now, on aspirin  He is now occasionally walking with a walker which is great  Aug 1 follow up: June 2017 echo EF: 55% with normal PA pressures  No new symptoms      Interval History:   No new cardiac complaints  Likely going to have dental surgery    Past Medical History:   Diagnosis Date    Gallstone pancreatitis 09/13/2015    Osteoarthritis of left knee     l knee pain    Vertigo        Review of Systems - Only symptom is his right knee pain, swelling  Having trouble sleeping  No cardiac symptoms  No Known Allergies        Current Outpatient Prescriptions:     aspirin (ECOTRIN LOW STRENGTH) 81 mg EC tablet, Take 1 tablet by mouth daily, Disp: , Rfl:     atorvastatin (LIPITOR) 80 mg tablet, Take 1 tablet by mouth daily, Disp: , Rfl:     bicalutamide (CASODEX) 50 mg tablet, Take 1 tablet by mouth daily, Disp: , Rfl:     Cholecalciferol (VITAMIN D3) 1000 units CAPS, Take 2 tablets by mouth daily, Disp: , Rfl:     cyanocobalamin (VITAMIN B-12) 100 mcg tablet, Take 2 capsules by mouth, Disp: , Rfl:     denosumab (PROLIA) 60 mg/mL, Inject under the skin every 6 (six) months, Disp: , Rfl:     diclofenac sodium (VOLTAREN) 1 %, AMA TOPICALLY BID AA, Disp: , Rfl: 2    docusate sodium (DULCOLAX) 100 mg capsule, Take by mouth, Disp: , Rfl:     escitalopram (LEXAPRO) 10 mg tablet, Take 1 tablet by mouth daily, Disp: , Rfl:     hydrocortisone 0 5 % cream, Insert into the rectum daily as needed, Disp: , Rfl:     leuprolide (ELIGARD) 45 MG injection, Inject under the skin, Disp: , Rfl:     levETIRAcetam (KEPPRA) 100 mg/mL oral solution, Take 1 mL by mouth 2 (two) times a day, Disp: , Rfl:     methocarbamol (ROBAXIN) 750 mg tablet, Take 1,000 mg by mouth 2 (two) times a day  , Disp: , Rfl:     mirtazapine (REMERON) 30 mg tablet, TAKE 1 TABLET BY MOUTH EVERY DAY AT BEDTIME, Disp: 90 tablet, Rfl: 3    oxyCODONE (OXY-IR) 5 MG capsule, Take 5 mg by mouth 2 (two) times a day, Disp: , Rfl:     polyethylene glycol (MIRALAX) 17 g packet, Take by mouth, Disp: , Rfl:     senna-docusate sodium (SENNA S) 8 6-50 mg per tablet, Take 1-2 tablets by mouth Daily, Disp: , Rfl:     amoxicillin (AMOXIL) 500 mg capsule, DNC, Disp: , Rfl: 0    metFORMIN (GLUCOPHAGE) 500 mg tablet, Take 1 tablet (500 mg total) by mouth 2 (two) times a day with meals for 90 days, Disp: 180 tablet, Rfl: 3     Current Outpatient Prescriptions:     aspirin (ECOTRIN LOW STRENGTH) 81 mg EC tablet, Take 1 tablet by mouth daily, Disp: , Rfl:     atorvastatin (LIPITOR) 80 mg tablet, Take 1 tablet by mouth daily, Disp: , Rfl:     bicalutamide (CASODEX) 50 mg tablet, Take 1 tablet by mouth daily, Disp: , Rfl:     Cholecalciferol (VITAMIN D3) 1000 units CAPS, Take 2 tablets by mouth daily, Disp: , Rfl:     cyanocobalamin (VITAMIN B-12) 100 mcg tablet, Take 2 capsules by mouth, Disp: , Rfl:     denosumab (PROLIA) 60 mg/mL, Inject under the skin every 6 (six) months, Disp: , Rfl:     diclofenac sodium (VOLTAREN) 1 %, AMA TOPICALLY BID AA, Disp: , Rfl: 2    docusate sodium (DULCOLAX) 100 mg capsule, Take by mouth, Disp: , Rfl:     escitalopram (LEXAPRO) 10 mg tablet, Take 1 tablet by mouth daily, Disp: , Rfl:     hydrocortisone 0 5 % cream, Insert into the rectum daily as needed, Disp: , Rfl:     leuprolide (ELIGARD) 45 MG injection, Inject under the skin, Disp: , Rfl:     levETIRAcetam (KEPPRA) 100 mg/mL oral solution, Take 1 mL by mouth 2 (two) times a day, Disp: , Rfl:     methocarbamol (ROBAXIN) 750 mg tablet, Take 1,000 mg by mouth 2 (two) times a day  , Disp: , Rfl:     mirtazapine (REMERON) 30 mg tablet, TAKE 1 TABLET BY MOUTH EVERY DAY AT BEDTIME, Disp: 90 tablet, Rfl: 3    oxyCODONE (OXY-IR) 5 MG capsule, Take 5 mg by mouth 2 (two) times a day, Disp: , Rfl:     polyethylene glycol (MIRALAX) 17 g packet, Take by mouth, Disp: , Rfl:     senna-docusate sodium (SENNA S) 8 6-50 mg per tablet, Take 1-2 tablets by mouth Daily, Disp: , Rfl:     amoxicillin (AMOXIL) 500 mg capsule, DNC, Disp: , Rfl: 0    metFORMIN (GLUCOPHAGE) 500 mg tablet, Take 1 tablet (500 mg total) by mouth 2 (two) times a day with meals for 90 days, Disp: 180 tablet, Rfl: 3    Social History     Social History    Marital status: /Civil Union     Spouse name: N/A    Number of children: N/A    Years of education: N/A     Occupational History    Not on file  Social History Main Topics    Smoking status: Former Smoker     Types: Cigarettes     Quit date: 1/1/1995    Smokeless tobacco: Never Used      Comment: passive smoke exposure    Alcohol use No    Drug use: No    Sexual activity: Not on file     Other Topics Concern    Not on file     Social History Narrative    No narrative on file       No family history on file  Physical Exam:    Vitals: Blood pressure 116/54, pulse 77, SpO2 93 %  , There is no height or weight on file to calculate BMI ,   Wt Readings from Last 3 Encounters:   04/26/16 63 5 kg (140 lb)   03/21/16 63 5 kg (140 lb)   03/15/16 63 5 kg (140 lb)       Physical Exam:    GEN: Stevie Ruiz  appears well, alert and oriented x 3, pleasant and cooperative   HEENT: pupils equal, round, and reactive to light; extraocular muscles intact  NECK: supple, no carotid bruits   HEART: regular rhythm, normal S1 and S2, no murmurs, clicks, gallops or rubs, JVP is  down  LUNGS: clear to auscultation bilaterally; no wheezes, rales, or rhonchi   ABDOMEN: normal bowel sounds, soft, no tenderness, no distention  EXTREMITIES: peripheral pulses normal; no clubbing, cyanosis, or edema   Has some swelling in knees  NEURO: no focal findings   SKIN: normal without suspicious lesions on exposed skin    Labs & Results:    Lab Results   Component Value Date    GLUCOSE 82 12/18/2015    CALCIUM 8 8 12/18/2015     12/18/2015    K 3 3 (L) 12/18/2015    CO2 27 12/18/2015     12/18/2015    BUN 9 12/18/2015    CREATININE 0 33 (L) 12/18/2015     Lab Results   Component Value Date    WBC 4 93 12/17/2015    HGB 13 8 12/17/2015    HCT 43 0 12/17/2015    MCV 95 12/17/2015     12/17/2015     No results found for: BNP   Lab Results   Component Value Date    CHOL 157 11/09/2014     Lab Results   Component Value Date    HDL 59 11/09/2014     Lab Results   Component Value Date    LDLCALC 85 11/09/2014     Lab Results   Component Value Date    TRIG 67 11/09/2014     No components found for: CHOLHDL    Echocardiogram 6/6/17  LVEF: 55%  LVIDd:  RV: normal  MR:  PASP: normal  RVOT:   Other:    EKG personally reviewed by Jeremy Ramirez DO  Counseling / Coordination of Care  Total floor / unit time spent today 25 minutes  Greater than 50% of total time was spent with the patient and / or family counseling and / or coordination of care  A description of the counseling / coordination of care: 15  Thank you for the opportunity to participate in the care of this patient    MAGALYS RODRIGUEZ [address]

## 2018-08-07 DIAGNOSIS — R52 PAIN: ICD-10-CM

## 2018-08-07 RX ORDER — OXYCODONE HYDROCHLORIDE 5 MG/1
5 CAPSULE ORAL 2 TIMES DAILY
Qty: 30 CAPSULE | Refills: 0 | Status: CANCELLED | OUTPATIENT
Start: 2018-08-07

## 2018-08-07 NOTE — TELEPHONE ENCOUNTER
He is taking methocarbamol for spasticity post-CVA  Ok to generate letter  Would the Rx refill be going to his pharmacy on file?

## 2018-08-08 DIAGNOSIS — R25.2 SPASTICITY AS LATE EFFECT OF CEREBROVASCULAR ACCIDENT (CVA): Primary | ICD-10-CM

## 2018-08-08 DIAGNOSIS — I69.398 SPASTICITY AS LATE EFFECT OF CEREBROVASCULAR ACCIDENT (CVA): Primary | ICD-10-CM

## 2018-08-08 RX ORDER — METHOCARBAMOL 750 MG/1
TABLET, FILM COATED ORAL
Qty: 270 TABLET | Refills: 1 | Status: SHIPPED | OUTPATIENT
Start: 2018-08-08 | End: 2018-10-17 | Stop reason: ALTCHOICE

## 2018-08-08 NOTE — TELEPHONE ENCOUNTER
The script needs to be printed and faxed along with the letter that was generated to the person/fax listed below  Once script signed off on (please make it to print)- Please send to your MA to fax

## 2018-08-08 NOTE — TELEPHONE ENCOUNTER
Need to refill med in a separate encounter  This encounter will not let me reorder the med because "the med is being ordered in a future encounter"  I have no idea what that means  Will refill and print the med in a new encounter

## 2018-08-17 DIAGNOSIS — R52 PAIN: Primary | ICD-10-CM

## 2018-08-17 RX ORDER — OXYCODONE HYDROCHLORIDE 5 MG/1
5 TABLET ORAL 2 TIMES DAILY PRN
Qty: 30 TABLET | Refills: 0 | Status: SHIPPED | OUTPATIENT
Start: 2018-08-17 | End: 2018-09-17 | Stop reason: SDUPTHER

## 2018-09-06 ENCOUNTER — TELEPHONE (OUTPATIENT)
Dept: FAMILY MEDICINE CLINIC | Facility: CLINIC | Age: 73
End: 2018-09-06

## 2018-09-06 NOTE — TELEPHONE ENCOUNTER
Spoke with Mrs Jose Addison - Mr Mejia sleep pattern is not doing good  Has been taking Remeron 30mg at hs, 5mg Oxy - been on this medication for a long time  And it is not helping  Patient starts to keep talking at night and it keeps him awake  Up 8-9 times a night  Mrs Jose Addison wants to know if there is anything she can do to help him   Please advise

## 2018-09-10 ENCOUNTER — DOCUMENTATION (OUTPATIENT)
Dept: FAMILY MEDICINE CLINIC | Facility: CLINIC | Age: 73
End: 2018-09-10

## 2018-09-10 NOTE — PROGRESS NOTES
I spoke to Mrs Mejia today  During the daytime she gives her  a see him enough in 1000 mg twice a day for pain  The oxycodone during the day did not seem to help him as much  At bedtime she gives him oxycodone 5 mg and Remeron 30 mg and methocarbamol  She is a nurse and she feels that his problem is that he is waking up at night with pain  I told her to stop the methocarbamol at bedtime and just give him the Remeron but give him 10 mg of oxycodone  Try this for the next 2 nights and then give me a call in 2 days and let me know how this has worked for him  We can no wheeze make adjustments in his prescriptions

## 2018-09-10 NOTE — TELEPHONE ENCOUNTER
Spoke with Mrs Beardo - pt is taking methocarbamol at 9am and 3pm  Also taking acetaminophen 500mg 2 tablets and none is helping  Mrs Ranjeet Naidu wants to know if his medications need to be increased, please advise

## 2018-09-13 DIAGNOSIS — R52 PAIN: ICD-10-CM

## 2018-09-13 RX ORDER — OXYCODONE HYDROCHLORIDE 5 MG/1
5 TABLET ORAL 2 TIMES DAILY PRN
Qty: 30 TABLET | Refills: 0 | Status: CANCELLED | OUTPATIENT
Start: 2018-09-13

## 2018-09-13 NOTE — TELEPHONE ENCOUNTER
Pt wife called stating regarding the previous messages with the medication not working  She states the first night it worked but the second and third night it wasn't that good for him  He complains of knee pain  She states he is taking the Oxycodone and Remeron  She would like a call back from Rex Kurtz  He also needs a refill on the oxycodone sent to pharmacy on file

## 2018-09-14 ENCOUNTER — TELEPHONE (OUTPATIENT)
Dept: FAMILY MEDICINE CLINIC | Facility: CLINIC | Age: 73
End: 2018-09-14

## 2018-09-14 NOTE — TELEPHONE ENCOUNTER
Patient wife called she would like for you to call her she called yesterday about her  that he needs a presp for oxycodone she can be reached at [phone]   TY

## 2018-09-17 DIAGNOSIS — R52 PAIN: ICD-10-CM

## 2018-09-17 NOTE — TELEPHONE ENCOUNTER
Dr Shaq Stark did not refill patient medication before he left patient can you please refill patient meds thank you     Oxycodone 5mg     [facility] in [place]   Info is attach

## 2018-09-18 RX ORDER — OXYCODONE HYDROCHLORIDE 5 MG/1
5 TABLET ORAL 2 TIMES DAILY PRN
Qty: 30 TABLET | Refills: 0 | Status: SHIPPED | OUTPATIENT
Start: 2018-09-18 | End: 2018-09-28 | Stop reason: SDUPTHER

## 2018-09-27 ENCOUNTER — TELEPHONE (OUTPATIENT)
Dept: FAMILY MEDICINE CLINIC | Facility: CLINIC | Age: 73
End: 2018-09-27

## 2018-09-27 NOTE — TELEPHONE ENCOUNTER
pts wife called requesting a refil on oxycodone this med was done on 09/18 and picked up by the pts wife I called the pharmacy and they gave me the information   Qty of 30 pts wife said that she has enough until Monday pt takes it twice a day

## 2018-09-28 DIAGNOSIS — R52 PAIN: ICD-10-CM

## 2018-09-28 RX ORDER — OXYCODONE HYDROCHLORIDE 5 MG/1
5 TABLET ORAL 2 TIMES DAILY PRN
Qty: 30 TABLET | Refills: 0 | Status: SHIPPED | OUTPATIENT
Start: 2018-09-28 | End: 2018-09-28 | Stop reason: SDUPTHER

## 2018-09-28 RX ORDER — OXYCODONE HYDROCHLORIDE 5 MG/1
5 TABLET ORAL 2 TIMES DAILY PRN
Qty: 30 TABLET | Refills: 0 | Status: SHIPPED | OUTPATIENT
Start: 2018-09-28 | End: 2018-10-16 | Stop reason: SDUPTHER

## 2018-10-10 ENCOUNTER — TELEPHONE (OUTPATIENT)
Dept: FAMILY MEDICINE CLINIC | Facility: CLINIC | Age: 73
End: 2018-10-10

## 2018-10-10 NOTE — TELEPHONE ENCOUNTER
Patient wife called left a message on physican line that she would like for you to call her she can be reached at [phone] ty

## 2018-10-11 NOTE — TELEPHONE ENCOUNTER
I know that cortisone injections into the knee  did not work great, in the past  However, as long as the knee is not red, swollen, or warm, then maybe we should try another cortisone injection and see if this gives some relief, before we just start racheting up the pain meds  If she wants to bring him in later, tomorrow morning (you pick the time), I will look at the knee and give the injection  Let her know that we are [address] him in and it will be a quick visit

## 2018-10-11 NOTE — TELEPHONE ENCOUNTER
Spoke with Mrs Mejia - last month is like a nightmare for Mr Elizabeth Emerson - complaining of knee pain and having problem sleeping  Has been taking Oxy 10mg; remeron 30mg and acetaminophen 1,000mg and it is not helping  Mrs Elizabeth Emerson wants to know if xray is needed or patient can take Flexeril   Patient is also starting to get depressed with the situation

## 2018-10-11 NOTE — TELEPHONE ENCOUNTER
Flexeril is a muscle relaxant and he is already on a muscle relaxant - methocarbamol  Does she want to stop the methocarbamol and try Flexeril instead ? ?

## 2018-10-11 NOTE — TELEPHONE ENCOUNTER
Spoke with Mrs Mejia -  Will be hard for them to come in tomorrow due to transpotation issue   Wants to know if there is any medication he can try like flexeril

## 2018-10-15 NOTE — TELEPHONE ENCOUNTER
Spoke with Mrs Mejia - wants to know if it is ok to take Flexeril with Remeron together or take Flexeril on other time of the day

## 2018-10-16 DIAGNOSIS — R52 PAIN: ICD-10-CM

## 2018-10-16 RX ORDER — OXYCODONE HYDROCHLORIDE 5 MG/1
5 TABLET ORAL 2 TIMES DAILY PRN
Qty: 30 TABLET | Refills: 0 | Status: SHIPPED | OUTPATIENT
Start: 2018-10-16 | End: 2018-10-29 | Stop reason: SDUPTHER

## 2018-10-16 NOTE — TELEPHONE ENCOUNTER
Patient wife called she said he needs a RX for Oxycodone 5 mg called into [facility] at [phone]  TY

## 2018-10-16 NOTE — TELEPHONE ENCOUNTER
Patient wife called she said said that she needs to speak with you in regards to her  please call her at [phone]   TY

## 2018-10-17 DIAGNOSIS — I61.2 NONTRAUMATIC HEMORRHAGE OF RIGHT CEREBRAL HEMISPHERE (HCC): Primary | ICD-10-CM

## 2018-10-17 RX ORDER — CYCLOBENZAPRINE HCL 10 MG
TABLET ORAL
Qty: 30 TABLET | Refills: 0
Start: 2018-10-17 | End: 2019-02-01 | Stop reason: ALTCHOICE

## 2018-10-17 NOTE — PROGRESS NOTES
Spoke with his wife and it seems that he has pain during the night but he is also waking up with bad dreams  She thinks that he is getting down in the dumps because he is just not feeling well  His pain actually seems to be better controlled during the day than it is at night  She stop the methocarbamol because she did not think it was doing anything  There is Flexeril left over from the [place] and she wants to try that as muscle relaxants  She gives him acetaminophen a 1000 mg in the afternoon and that seems to control his knee pain pretty well during the day  She then gives him oxycodone 5 mg -2 tablets at bedtime  We discussed bringing him into the office for a cortisone injection into the left knee but she said cortisone injections in the past did not last   I explained to her that sometimes they do work if they have not worked in the past and might give him some relief but we will keep that in mind  For now we are going to decrease the mirtazapine to 15 mg p o  HS for 1 week and then stop it  The oxycodone will stay the same  Start Flexeril 10 mg p o  b i d  1 in the morning and 1 earlier in the evening  She has been given the Remeron and the oxycodone together at bedtime and she can do that until the Remeron is finished  She will call me in about a week or 2 and give me a status report    And dictation

## 2018-10-22 ENCOUNTER — OFFICE VISIT (OUTPATIENT)
Dept: NEUROLOGY | Facility: CLINIC | Age: 73
End: 2018-10-22
Payer: MEDICARE

## 2018-10-22 VITALS — SYSTOLIC BLOOD PRESSURE: 117 MMHG | RESPIRATION RATE: 14 BRPM | HEART RATE: 65 BPM | DIASTOLIC BLOOD PRESSURE: 69 MMHG

## 2018-10-22 DIAGNOSIS — Z86.73 HISTORY OF CVA (CEREBROVASCULAR ACCIDENT): Primary | ICD-10-CM

## 2018-10-22 DIAGNOSIS — G40.909 SEIZURE DISORDER (HCC): ICD-10-CM

## 2018-10-22 DIAGNOSIS — M25.562 CHRONIC PAIN OF LEFT KNEE: ICD-10-CM

## 2018-10-22 DIAGNOSIS — G89.29 CHRONIC PAIN OF LEFT KNEE: ICD-10-CM

## 2018-10-22 PROCEDURE — 99214 OFFICE O/P EST MOD 30 MIN: CPT | Performed by: PHYSICIAN ASSISTANT

## 2018-10-22 RX ORDER — METHOCARBAMOL 750 MG/1
500 TABLET, FILM COATED ORAL EVERY 6 HOURS PRN
COMMUNITY
End: 2019-02-01 | Stop reason: ALTCHOICE

## 2018-10-22 RX ORDER — MIRTAZAPINE 15 MG/1
15 TABLET, FILM COATED ORAL
COMMUNITY
End: 2018-10-25 | Stop reason: SINTOL

## 2018-10-22 NOTE — PROGRESS NOTES
Patient ID: Cristobal Buitrago  is a 74 y o  male  Assessment/Plan:    History of CVA (cerebrovascular accident)  Remains stable, no new neuro symptoms to indicate recurrent CVA/TIA  He has been off Coumadin and on ASA 81mg alone, along with statin  He reports he is off Robaxin for spasticity, did not feel it was helpful  He has flexeril he uses  Will refer to physiatry, previously seen by Elmira Valverde  Would cont current regimen  He will cont to follow with PCP and cardiology as well  Seizure disorder ([facility] [address] )  No breakthrough seizures, remains on Keppra  Chronic pain of left knee  Left knee pain remains his biggest issue  He has seen multiple orthos, pain management  He has been deemed not a surgical candidate by ortho, although he would likely benefit from left TKR  Currently getting pain meds through PCP  Referring to physiatry due to his post-stroke spasticity, but can also see if they have any ideas for his knee  Patient will follow up in 6 months or sooner if needed  He will call for any new or worsening symptoms  Diagnoses and all orders for this visit:    History of CVA (cerebrovascular accident)    Seizure disorder ([facility] [address] )    Chronic pain of left knee  -     Ambulatory referral to Physical Medicine Rehab; Future    Other orders           Subjective:    HPI    Patient is a 74year old male who presents for neurological follow up, last seen in April 2018  Patient with PMH of agent orange exposure, prostate cancer followed by Dr Chetan Ackerman, paroxysmal AFib, and CVA in Nov 2014 in right MCA, followed by second embolic event within a few weeks  Patient had a clot found related to apical thrombus  Patient was given TPA for first stroke but then had a grand mal seizure and found to have another CVA on the opposite side  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 with severe dysarthria  He had some PAF during his hospitalization at that time  He required a PEG and trach due to his condition   He was in the [facility] for about 3 weeks and started on Coumadin  His wife has been an excellent caregiver and he had outpatient services at [facility]  He was started on Keppra for his seizure and no further seizure activity noted  He saw cardiology in June 2016, and apparently was taken off his Coumadin  According to the note, cardiologist felt his PAF that was only seen in the hospital in 2014 was isolated and no evidence of AFib since then  He remains on ASA 81mg  Today, patient reports he is doing well  He denies any new symptoms  His wife continues to be happy with his progress  He has been walking with the walker and the help of his aide and wife  The biggest issue remains his left knee osteoarthritis  He has had numerous injections without success, has seen ortho and not a surgical candidate  He has also been followed by pain management  His wife reports he is having some difficulty with sleep and PCP has been adjusting his meds  He is no longer taking Robaxin, as they did not think it was helpful anymore  No breakthrough seizures reported, no zoning, LOC  The following portions of the patient's history were reviewed and updated as appropriate: current medications, past family history, past medical history, past social history, past surgical history and problem list          Objective:    Blood pressure 117/69, pulse 65, resp  rate 14  Physical Exam   Constitutional: He appears well-developed and well-nourished  HENT:   Head: Normocephalic and atraumatic  Eyes: Pupils are equal, round, and reactive to light  EOM are normal    Cardiovascular: Intact distal pulses  Pulmonary/Chest: Effort normal    Skin: Skin is warm and dry  Psychiatric: He has a normal mood and affect  Neurological Exam  Mental Status   Oriented to person, place, time and situation  Recent and remote memory are intact  Severe dysarthria present  Follows two-step commands  Needs some prompting   Attention and concentration are normal     Cranial Nerves  CN II: Visual fields full to confrontation  CN III, IV, VI: Extraocular movements intact bilaterally  Pupils equal round and reactive to light bilaterally  CN V: Facial sensation is normal   CN VII: Full and symmetric facial movement  CN VIII: Hearing is normal   CN IX, X: Palate elevates symmetrically  Normal gag reflex  CN XI: Shoulder shrug strength is normal   CN XII: Tongue midline without atrophy or fasciculations  Motor   Increased muscle tone  Right                     Left   Shoulder abduction               5                          3  Elbow flexion                         5                          0  Elbow extension                    5                          0  Hip flexion                              4                          1  Knee extension                      4                          2  Dorsiflexion                            4                          1  Flaccid left upper extremity   Sensory  Left hemisensory loss  Reflexes  Increased DTRs on the left  Coordination  Normal on the right, unable to assess on the left  Gait  Wheelchair today  ROS:    Review of Systems   Constitutional: Positive for unexpected weight change  Negative for appetite change and fever  HENT: Negative  Negative for hearing loss, tinnitus, trouble swallowing and voice change  Eyes: Negative  Negative for photophobia and pain  Respiratory: Negative  Negative for shortness of breath  Cardiovascular: Negative  Negative for palpitations  Gastrointestinal: Negative  Negative for nausea and vomiting  Endocrine: Negative  Negative for cold intolerance and heat intolerance  Genitourinary: Negative  Negative for dysuria, frequency and urgency  Musculoskeletal: Negative  Negative for myalgias and neck pain  Knee pain     Skin: Negative  Negative for rash  Neurological: Negative    Negative for dizziness, tremors, seizures, syncope, facial asymmetry, speech difficulty, weakness, light-headedness, numbness and headaches  Hematological: Negative  Does not bruise/bleed easily  Psychiatric/Behavioral: Positive for sleep disturbance  Negative for confusion and hallucinations       I personally reviewed the ROS

## 2018-10-22 NOTE — PATIENT INSTRUCTIONS
Continue Aspirin and Lipitor for secondary stroke prevention  Continue Keppra for history of seizure  Will refer you to physiatry to discuss left knee pain and spasticity  Follow up in 6 months or sooner if needed    Call for any new symptoms

## 2018-10-22 NOTE — ASSESSMENT & PLAN NOTE
Left knee pain remains his biggest issue  He has seen multiple orthos, pain management  He has been deemed not a surgical candidate by ortho, although he would likely benefit from left TKR  Currently getting pain meds through PCP  Referring to physiatry due to his post-stroke spasticity, but can also see if they have any ideas for his knee

## 2018-10-22 NOTE — ASSESSMENT & PLAN NOTE
Remains stable, no new neuro symptoms to indicate recurrent CVA/TIA  He has been off Coumadin and on ASA 81mg alone, along with statin  He reports he is off Robaxin for spasticity, did not feel it was helpful  He has flexeril he uses  Will refer to physiatry, previously seen by Ashia Valverde  Would cont current regimen  He will cont to follow with PCP and cardiology as well

## 2018-10-24 ENCOUNTER — TELEPHONE (OUTPATIENT)
Dept: FAMILY MEDICINE CLINIC | Facility: CLINIC | Age: 73
End: 2018-10-24

## 2018-10-24 LAB — HBA1C MFR BLD HPLC: 5.4 %

## 2018-10-24 NOTE — TELEPHONE ENCOUNTER
Patient wife called she would like a call in regards to her  change in his medication she can be reached at [phone] she would like to speak with dr Kang Andujar

## 2018-10-25 NOTE — PROGRESS NOTES
Talked to his wife and Caretaker  She would like to try decreasing or stopping the mirtazapine due to perceived nightmares  OK o stop the mirtazapine and call me next week with an update

## 2018-10-29 DIAGNOSIS — R52 PAIN: ICD-10-CM

## 2018-10-29 RX ORDER — OXYCODONE HYDROCHLORIDE 5 MG/1
5 TABLET ORAL 2 TIMES DAILY PRN
Qty: 60 TABLET | Refills: 0 | Status: SHIPPED | OUTPATIENT
Start: 2018-10-29 | End: 2018-11-26 | Stop reason: SDUPTHER

## 2018-11-05 ENCOUNTER — TELEPHONE (OUTPATIENT)
Dept: UROLOGY | Facility: CLINIC | Age: 73
End: 2018-11-05

## 2018-11-05 DIAGNOSIS — N39.0 URINARY TRACT INFECTION WITHOUT HEMATURIA, SITE UNSPECIFIED: Primary | ICD-10-CM

## 2018-11-05 RX ORDER — CIPROFLOXACIN 500 MG/1
500 TABLET, FILM COATED ORAL EVERY 12 HOURS SCHEDULED
Qty: 14 TABLET | Refills: 0 | Status: SHIPPED | OUTPATIENT
Start: 2018-11-05 | End: 2018-11-12

## 2018-11-05 NOTE — TELEPHONE ENCOUNTER
Pt 's wife called this morning stating that she thinks pt  Has a UTI  She said that pt 's urine has a bad odor and she is concerned  She also asked that if we order a ua/culture for this pt that we send it to [facility]  She is not able to get pt out of the house and they can normally come to their home to preform labs  She gave me a phone number of [phone] for this facility

## 2018-11-06 NOTE — TELEPHONE ENCOUNTER
I called pt 's wife this morning to follow regarding the U/A-culture that we ordered  There was no answer when I called so I did l/m on her cell phone voicemail and will wait for her return call

## 2018-11-26 DIAGNOSIS — R52 PAIN: ICD-10-CM

## 2018-11-26 RX ORDER — OXYCODONE HYDROCHLORIDE 5 MG/1
5 TABLET ORAL 2 TIMES DAILY PRN
Qty: 60 TABLET | Refills: 0 | Status: SHIPPED | OUTPATIENT
Start: 2018-11-26 | End: 2018-12-26 | Stop reason: SDUPTHER

## 2018-11-26 NOTE — TELEPHONE ENCOUNTER
Patient needs RX for his Oxycodone 5 mg called into [facility] at [phone] and she also asked gamal for you to call her at [phone] she would like to speak with you    TY

## 2018-12-03 ENCOUNTER — TELEPHONE (OUTPATIENT)
Dept: FAMILY MEDICINE CLINIC | Facility: CLINIC | Age: 73
End: 2018-12-03

## 2018-12-03 NOTE — TELEPHONE ENCOUNTER
Patient  called she would like for you to give her a call she said that she had called last week also and that she needs to speak with you she can be reached at [phone]  TY

## 2018-12-03 NOTE — TELEPHONE ENCOUNTER
Patient wife called back she said when you can to call her back at [phone] she said that she apologizes that she missed your call she said she will make sure she has phone with her    TY

## 2018-12-04 DIAGNOSIS — G47.9 DIFFICULTY SLEEPING: Primary | ICD-10-CM

## 2018-12-04 DIAGNOSIS — G47.9 DIFFICULTY SLEEPING: ICD-10-CM

## 2018-12-04 RX ORDER — TRAZODONE HYDROCHLORIDE 50 MG/1
50 TABLET ORAL
Qty: 30 TABLET | Refills: 1 | Status: SHIPPED | OUTPATIENT
Start: 2018-12-04 | End: 2018-12-04 | Stop reason: SDUPTHER

## 2018-12-04 RX ORDER — TRAZODONE HYDROCHLORIDE 50 MG/1
TABLET ORAL
Qty: 90 TABLET | Refills: 1 | Status: SHIPPED | OUTPATIENT
Start: 2018-12-04 | End: 2019-01-23 | Stop reason: SDUPTHER

## 2018-12-04 NOTE — TELEPHONE ENCOUNTER
If the oxycodone at bedtime at the present dose controls his pain then there is no reason to change that  She could try the melatonin or we could add a medication called trazodone 50 mg p o  At bedtime  Trazodone is more of a sleep medication then an antidepressant  It probably would be more effective than melatonin  I do not find melatonin to be that helpful

## 2018-12-04 NOTE — TELEPHONE ENCOUNTER
Spoke with Mrs Beardo - patient 20% better without remeron, just get 2 oxy at night  Patient wife wants to know if she can give melatonin and pheanine to Mr Noe Prathernateallan or plain melatonin to help patient sleep  Giving medication 2 hour apart   Oxy is helping but it is not that much

## 2018-12-06 ENCOUNTER — TELEPHONE (OUTPATIENT)
Dept: FAMILY MEDICINE CLINIC | Facility: CLINIC | Age: 73
End: 2018-12-06

## 2018-12-06 NOTE — TELEPHONE ENCOUNTER
Patient is calling because he cant swallow pills and he is taking trazodone and pharmacy says not to crush this medication Catherine are able to do a company needs a new Rx   If you can please follow up with wife thank you

## 2018-12-06 NOTE — TELEPHONE ENCOUNTER
[address] [phone]   All they need a verbal or letting stating that they can used the trazodone and turn into a compound

## 2018-12-06 NOTE — TELEPHONE ENCOUNTER
Please advise - Mrs Sean Garcia states that patient is unable to swallow pill  And she was given advise at [facility]'s that they can make it into compound

## 2018-12-18 ENCOUNTER — TELEPHONE (OUTPATIENT)
Dept: UROLOGY | Facility: AMBULATORY SURGERY CENTER | Age: 73
End: 2018-12-18

## 2018-12-18 ENCOUNTER — TELEPHONE (OUTPATIENT)
Dept: FAMILY MEDICINE CLINIC | Facility: CLINIC | Age: 73
End: 2018-12-18

## 2018-12-18 NOTE — TELEPHONE ENCOUNTER
Spoke with wife, she would like to know the PSA results  Patient went to [facility]   Please call back

## 2018-12-20 ENCOUNTER — TELEPHONE (OUTPATIENT)
Dept: FAMILY MEDICINE CLINIC | Facility: CLINIC | Age: 73
End: 2018-12-20

## 2018-12-20 NOTE — TELEPHONE ENCOUNTER
If the sleep problem is related to knee pain, then relieving the pain should be our goal  We had talked about trying another knee joint injection, to see if it gives any benefit  The other option is to increase the bedtime dose of oxycodone

## 2018-12-20 NOTE — TELEPHONE ENCOUNTER
Mrs Jg Tamez wants to know if you want her to increase the dosage at night by giving another dose  Currently patient is getting 2 tablets HS - do you want him to take 3 tablets?

## 2018-12-20 NOTE — TELEPHONE ENCOUNTER
If he is taking 2 oxycodone (5mg each) at bedtime, that is 10mg  Try increasing to 2 5 tablets at bedtime, which would be 12 5mg at bedtime  Adjust the prescription

## 2018-12-20 NOTE — TELEPHONE ENCOUNTER
Spoke with patient spouse - patient still having difficulty sleeping and excruciating knee pain  Has been taking Trazodone 50 mg and Oxycodone 10 mg  Mrs Amos Farzana  Wants to know what will be the next step?  Please advise

## 2018-12-26 ENCOUNTER — TELEPHONE (OUTPATIENT)
Dept: UROLOGY | Facility: AMBULATORY SURGERY CENTER | Age: 73
End: 2018-12-26

## 2018-12-26 DIAGNOSIS — R52 PAIN: ICD-10-CM

## 2018-12-26 RX ORDER — OXYCODONE HYDROCHLORIDE 5 MG/1
12.5 TABLET ORAL 2 TIMES DAILY PRN
Qty: 90 TABLET | Refills: 0 | Status: SHIPPED | OUTPATIENT
Start: 2018-12-26 | End: 2019-01-31 | Stop reason: SDUPTHER

## 2018-12-26 NOTE — TELEPHONE ENCOUNTER
Patient's wife called wanted to know if it was ok with xiang to moved his appointment to late January

## 2019-01-21 ENCOUNTER — TELEPHONE (OUTPATIENT)
Dept: NEUROLOGY | Facility: CLINIC | Age: 74
End: 2019-01-21

## 2019-01-21 NOTE — TELEPHONE ENCOUNTER
Received request for records for medicare audit for a knee brace script   These documents were needed urgently due to a deadline I printed off the notes we had from the timeframe of 08/01/15 through 01/29/16 from [employer] and faxed them over to [id number] [address] [facility]  Request for records was scanned into chart

## 2019-01-21 NOTE — TELEPHONE ENCOUNTER
Received call from Addie Pandya from [facility] [phone]  looking for medical record resolve for 2016 service/ states faxed to [phone] last week attn Yudy Clayton / she will refax to [place] today

## 2019-01-23 DIAGNOSIS — G47.9 DIFFICULTY SLEEPING: ICD-10-CM

## 2019-01-23 RX ORDER — TRAZODONE HYDROCHLORIDE 50 MG/1
50 TABLET ORAL
Qty: 90 TABLET | Refills: 1 | Status: SHIPPED | OUTPATIENT
Start: 2019-01-23 | End: 2019-02-08 | Stop reason: ALTCHOICE

## 2019-01-23 NOTE — TELEPHONE ENCOUNTER
Script for trazadone needs to be re-written and faxed to kelly [phone]   trazadone compound into 10mg/ml   5ml hs   Total 50ml

## 2019-01-25 ENCOUNTER — OFFICE VISIT (OUTPATIENT)
Dept: UROLOGY | Facility: CLINIC | Age: 74
End: 2019-01-25
Payer: MEDICARE

## 2019-01-25 VITALS — HEART RATE: 52 BPM | DIASTOLIC BLOOD PRESSURE: 76 MMHG | SYSTOLIC BLOOD PRESSURE: 126 MMHG

## 2019-01-25 DIAGNOSIS — C61 MALIGNANT NEOPLASM OF PROSTATE (HCC): Primary | ICD-10-CM

## 2019-01-25 PROCEDURE — 96402 CHEMO HORMON ANTINEOPL SQ/IM: CPT | Performed by: PHYSICIAN ASSISTANT

## 2019-01-25 PROCEDURE — 99213 OFFICE O/P EST LOW 20 MIN: CPT | Performed by: PHYSICIAN ASSISTANT

## 2019-01-25 NOTE — PROGRESS NOTES
Tian Milian  [id number]  1945    Vitals:    01/25/19 1127   BP: 126/76   Patient Position: Sitting   Cuff Size: Adult   Pulse: (!) 52     Patient presents today for 6 month Eligard injection  Injection given (right abdomen) without complication  As per THELMA Monterroso patient did not receive his 6 month Prolia injection today  Patient will keep his follow up appointment for his next injection      Edilberto Rodriguez MA

## 2019-01-25 NOTE — PROGRESS NOTES
UROLOGY PROGRESS NOTE   Patient Identifiers: Leighton Kowalski (MRN [id number])  Date of Service: 1/25/2019    Subjective:    38-year-old man with a history of Shira 6 prostate cancer  He had a radical prostatectomy in 2001 his PSA was undetectable until 2011  He is a [place] and has Agent [employer] benefits  A Prostascint scan at that time showed retroperitoneal lymph nodes and he was started on hormonal blockade  Unfortunately he had a catastrophic stroke in 2014 and has significant residual affects  His PSA laila to 12 7 and last winter I started him on bicalutamide and metformin  His previous PSA was 6 8  Current PSA is 8 9  He is in a wheelchair and has a left hemiplegia  He is awake and alert and is cared for by his wife  He received Eligard 45 mg today  He was on Prolia  He  Now has all his teeth removed and has dentures  Patient has  no complaints        Objective:     VITALS:    Vitals:    01/25/19 1127   BP: 126/76   Pulse: (!) 52           LABS:  Lab Results   Component Value Date    HGB 13 8 12/17/2015    HCT 43 0 12/17/2015    WBC 4 93 12/17/2015     12/17/2015   ]    Lab Results   Component Value Date     12/18/2015    K 3 3 (L) 12/18/2015     12/18/2015    CO2 27 12/18/2015    BUN 9 12/18/2015    CREATININE 0 33 (L) 12/18/2015    CALCIUM 8 8 12/18/2015    GLUCOSE 82 12/18/2015   ]        INPATIENT MEDS:    Current Outpatient Prescriptions:     amoxicillin (AMOXIL) 500 mg capsule, DNC, Disp: , Rfl: 0    aspirin (ECOTRIN LOW STRENGTH) 81 mg EC tablet, Take 1 tablet by mouth daily, Disp: , Rfl:     atorvastatin (LIPITOR) 80 mg tablet, Take 1 tablet by mouth daily, Disp: , Rfl:     bicalutamide (CASODEX) 50 mg tablet, Take 1 tablet by mouth daily, Disp: , Rfl:     Cholecalciferol (VITAMIN D3) 1000 units CAPS, Take 2 tablets by mouth daily, Disp: , Rfl:     cyanocobalamin (VITAMIN B-12) 100 mcg tablet, Take 2 capsules by mouth, Disp: , Rfl:     cyclobenzaprine (FLEXERIL) 10 mg tablet, Give 10mg, orally, twice a day , Disp: 30 tablet, Rfl: 0    denosumab (PROLIA) 60 mg/mL, Inject under the skin every 6 (six) months, Disp: , Rfl:     diclofenac sodium (VOLTAREN) 1 %, AMA TOPICALLY BID AA, Disp: , Rfl: 2    docusate sodium (DULCOLAX) 100 mg capsule, Take by mouth, Disp: , Rfl:     escitalopram (LEXAPRO) 10 mg tablet, Take 1 tablet by mouth daily, Disp: , Rfl:     hydrocortisone 0 5 % cream, Insert into the rectum daily as needed, Disp: , Rfl:     leuprolide (ELIGARD) 45 MG injection, Inject under the skin, Disp: , Rfl:     levETIRAcetam (KEPPRA) 100 mg/mL oral solution, Take 1 mL by mouth 2 (two) times a day, Disp: , Rfl:     metFORMIN (GLUCOPHAGE) 500 mg tablet, Take 1 tablet (500 mg total) by mouth 2 (two) times a day with meals for 90 days, Disp: 180 tablet, Rfl: 3    methocarbamol (ROBAXIN) 750 mg tablet, Take 500 mg by mouth every 6 (six) hours as needed for muscle spasms, Disp: , Rfl:     oxyCODONE (ROXICODONE) 5 mg immediate release tablet, Take 2 5 tablets (12 5 mg total) by mouth 2 (two) times a day as needed for moderate pain Earliest Fill Date: 12/26/18 Max Daily Amount: 25 mg, Disp: 90 tablet, Rfl: 0    polyethylene glycol (MIRALAX) 17 g packet, Take by mouth, Disp: , Rfl:     senna-docusate sodium (SENNA S) 8 6-50 mg per tablet, Take 1-2 tablets by mouth Daily, Disp: , Rfl:     traZODone (DESYREL) 50 mg tablet, Take 1 tablet (50 mg total) by mouth daily at bedtime, Disp: 90 tablet, Rfl: 1    Current Facility-Administered Medications:     leuprolide (ELIGARD 6 MONTH KIT) subcutaneous injection 45 mg, 45 mg, Subcutaneous, Once, Claudetta Harry, PA-C      Physical Exam:   /76 (Patient Position: Sitting, Cuff Size: Adult)   Pulse (!) 52   GEN: no acute distress    RESP: breathing comfortably with no accessory muscle use    ABD: soft, non-tender, non-distended   INCISION:    EXT: no significant peripheral edema     RADIOLOGY:     None Assessment:    1   Castrate resistant prostate cancer     Plan:   - she wishes to remain conservative at this time  - will continue his same treatment  - check a PSA in 3 months and in 6 months prior to his next visit  - if his PSA continues to rise I recommended follow-up with Medical Oncology for treatment of castrate resistant prostate cancer no

## 2019-01-31 DIAGNOSIS — R52 PAIN: ICD-10-CM

## 2019-01-31 RX ORDER — OXYCODONE HYDROCHLORIDE 5 MG/1
2.5 TABLET ORAL 2 TIMES DAILY PRN
Qty: 90 TABLET | Refills: 0 | Status: SHIPPED | OUTPATIENT
Start: 2019-01-31 | End: 2019-02-05 | Stop reason: SDUPTHER

## 2019-02-05 ENCOUNTER — TELEPHONE (OUTPATIENT)
Dept: FAMILY MEDICINE CLINIC | Facility: CLINIC | Age: 74
End: 2019-02-05

## 2019-02-05 DIAGNOSIS — R52 PAIN: ICD-10-CM

## 2019-02-05 RX ORDER — OXYCODONE HYDROCHLORIDE 5 MG/1
2.5 TABLET ORAL 2 TIMES DAILY PRN
Qty: 90 TABLET | Refills: 0 | Status: SHIPPED | OUTPATIENT
Start: 2019-02-05 | End: 2019-02-05 | Stop reason: SDUPTHER

## 2019-02-05 RX ORDER — OXYCODONE HYDROCHLORIDE 5 MG/1
2.5 TABLET ORAL 2 TIMES DAILY PRN
Qty: 90 TABLET | Refills: 0 | Status: SHIPPED | OUTPATIENT
Start: 2019-02-05 | End: 2019-02-08 | Stop reason: DRUGHIGH

## 2019-02-06 NOTE — TELEPHONE ENCOUNTER
Spoke with Mrs Beardo -  Patient sleeps 2 hours per night then wakes up due to to pain, and then patient starts to keep on talking and by 3 am patient is exhausted  Mrs Leah Estrella wants to know what she can offer him during the night   On Oxycodone 12 5 mg HS

## 2019-02-06 NOTE — TELEPHONE ENCOUNTER
Please bring the [facility] up to date, including the trazodone, carbamazepine and cyclobenzaprine  How much oxycodone is he taking at bedtime ? If the trazodone is not helping and he did not do well with the higher doses of trazodone, then we might as well stop it  Also, so the more that he naps during the day, the more trouble he will have sleeping at night  How long can he sleep at night before he wakes up and complains of pain ?

## 2019-02-06 NOTE — TELEPHONE ENCOUNTER
You need to talk with her and verify some meds  My notes say that he is on cyclobenzaprine (Flexeril) and carbamazepine (Robaxin)  The trazodone, at bedtime, had been higher than 12 5mg  Also, how much pain medication (oxycodone) is she giving him at bedtime ? Is he waking up at night because of pain ? Is he taking naps during the day ?

## 2019-02-06 NOTE — TELEPHONE ENCOUNTER
Spoke with Mrs Jackie Nunez was stopped before  Haven't been using flexeril because of Trazodone and Oxycodone at bedtime  Currently on oxycodone mg at bedtime  Patient is very restless, wakes up at night due to pain, take naps during the day

## 2019-02-07 ENCOUNTER — TELEPHONE (OUTPATIENT)
Dept: FAMILY MEDICINE CLINIC | Facility: CLINIC | Age: 74
End: 2019-02-07

## 2019-02-07 NOTE — TELEPHONE ENCOUNTER
Patient wife called she said she would like to speak with you she asked if you call her at [phone]  TY

## 2019-02-08 DIAGNOSIS — R52 PAIN: ICD-10-CM

## 2019-02-08 DIAGNOSIS — R52 PAIN: Primary | ICD-10-CM

## 2019-02-08 PROBLEM — M79.605 LEG PAIN, DIFFUSE, LEFT: Status: ACTIVE | Noted: 2019-02-08

## 2019-02-08 RX ORDER — BACLOFEN 10 MG/1
TABLET ORAL
Qty: 60 TABLET | Refills: 5 | Status: SHIPPED | OUTPATIENT
Start: 2019-02-08 | End: 2019-02-08 | Stop reason: SDUPTHER

## 2019-02-08 RX ORDER — BACLOFEN 10 MG/1
TABLET ORAL
Qty: 540 TABLET | Refills: 5 | Status: SHIPPED | OUTPATIENT
Start: 2019-02-08 | End: 2019-02-20

## 2019-02-08 RX ORDER — OXYCODONE HYDROCHLORIDE 5 MG/1
10 TABLET ORAL
Qty: 60 TABLET | Refills: 0
Start: 2019-02-08 | End: 2019-02-19 | Stop reason: SDUPTHER

## 2019-02-08 NOTE — PROGRESS NOTES
I spent a good time today discussing the patient's nighttime issues with his wife who is an RN  She gives him trazodone at nighttime 12 point g but she also gives him tramadol 50 mg at 7:00 p m  And gives him oxycodone 12 5 mg at 9:00 p m  Rascon Gary During the daytime he does not require any tramadol or oxycodone  While he does have discomfort and some loss that is this of the left leg is worse bedtime  He goes to sleep and gets maybe 2 hr of sleep and then his weight and can go back to sleep because of discomforts in the leg  It is not exactly pain and he has a left 2 hr worth pain relief or relief of discomfort within nighttime oxycodone  His wife and I both think that the oxycodone is doing nothing  He currently has not been on a muscle relaxant  The trazodone does not seem with sleep  His wife manages his medications and I want her to stop the low-dose trazodone and decrease oxycodone  to 10 mg p o  HS  We did decrease the oxycodone by 2 5 mg every 2 weeks until he is off of it  Also will keep the trazodone on board at 50 mg once a day in the evening  I am going to start him on baclofen 5 mg p o  Every 6 hr around the clock at 6 0-6-12  She will call me if any

## 2019-02-13 ENCOUNTER — TELEPHONE (OUTPATIENT)
Dept: FAMILY MEDICINE CLINIC | Facility: CLINIC | Age: 74
End: 2019-02-13

## 2019-02-19 ENCOUNTER — TELEPHONE (OUTPATIENT)
Dept: FAMILY MEDICINE CLINIC | Facility: CLINIC | Age: 74
End: 2019-02-19

## 2019-02-19 DIAGNOSIS — R52 PAIN: ICD-10-CM

## 2019-02-19 NOTE — TELEPHONE ENCOUNTER
Spoke with Mrs Mejia - discontinuing trazodone has no significant changes  Pain is still present even with pain medication  Still having trouble sleeping, Patient is not taking nap  Currently on Oxycodone 10 mg HS  Mrs Ced Pradhan wants to know if there is anything she can do?

## 2019-02-20 ENCOUNTER — TELEPHONE (OUTPATIENT)
Dept: FAMILY MEDICINE CLINIC | Facility: CLINIC | Age: 74
End: 2019-02-20

## 2019-02-20 DIAGNOSIS — R52 PAIN: ICD-10-CM

## 2019-02-20 RX ORDER — OXYCODONE HYDROCHLORIDE 5 MG/1
10 TABLET ORAL
Qty: 60 TABLET | Refills: 0 | Status: SHIPPED | OUTPATIENT
Start: 2019-02-20 | End: 2019-03-15 | Stop reason: SDUPTHER

## 2019-02-20 RX ORDER — BACLOFEN 10 MG/1
TABLET ORAL
Qty: 540 TABLET | Refills: 5
Start: 2019-02-20

## 2019-02-20 NOTE — PROGRESS NOTES
I spoke with Mrs Mejia today  We agree that mirtazapine was of no benefit for him either in relaxing him at night or helping him to sleep  Trazodone was no better  The consideration is that his pain is worse at night and no leg pain keeps him awake  She had decreased the oxycodone down to 10 mg p o  HS and she says she has not noticed much of a difference  He is still getting the baclofen 5 mg orally every 6 hours and she would like to continue that  We should continue the oxycodone at 10 mg orally hs  Increase the baclofen to 10 mg at 1800 and 2400  Will keep the other daytime doses at the 5 mg dosage  She will call me back in the next week or 2 to let me know how that is going    [facility]en[facility]

## 2019-02-28 ENCOUNTER — TELEPHONE (OUTPATIENT)
Dept: FAMILY MEDICINE CLINIC | Facility: CLINIC | Age: 74
End: 2019-02-28

## 2019-02-28 NOTE — TELEPHONE ENCOUNTER
Patient wife called she said that he hasn't had a bowel movement for 5 days now she gave him Miralax and milk of magnesium and a enemia has no discomfort stomach not extended she wants to know if he doesn't go today can she try to give him some Ermalinda Esteban oil wife # [phone]  Ty

## 2019-02-28 NOTE — TELEPHONE ENCOUNTER
NO !!!!!  Continue the Miralax - one capful dissolved in 6 ounces of water, and could use twice a day, until his bowels start moving, then decrease to once a day and continue like this to prevent further constipation  Can add prune juice, 4 ounces, orally, once a day  I would not give him another enema unless he is uncomfortable and bloated

## 2019-03-15 DIAGNOSIS — R52 PAIN: ICD-10-CM

## 2019-03-15 RX ORDER — OXYCODONE HYDROCHLORIDE 5 MG/1
10 TABLET ORAL
Qty: 60 TABLET | Refills: 0 | Status: SHIPPED | OUTPATIENT
Start: 2019-03-15 | End: 2019-04-15 | Stop reason: SDUPTHER

## 2019-04-08 DIAGNOSIS — F33.9 RECURRENT MAJOR DEPRESSIVE DISORDER, REMISSION STATUS UNSPECIFIED (HCC): Primary | ICD-10-CM

## 2019-04-08 DIAGNOSIS — F33.9 RECURRENT MAJOR DEPRESSIVE DISORDER, REMISSION STATUS UNSPECIFIED (HCC): ICD-10-CM

## 2019-04-08 RX ORDER — ESCITALOPRAM OXALATE 10 MG/1
TABLET ORAL
Qty: 90 TABLET | Refills: 1 | Status: SHIPPED | OUTPATIENT
Start: 2019-04-08

## 2019-04-08 RX ORDER — ESCITALOPRAM OXALATE 10 MG/1
10 TABLET ORAL DAILY
Qty: 30 TABLET | Refills: 1 | Status: SHIPPED | OUTPATIENT
Start: 2019-04-08 | End: 2019-04-08 | Stop reason: SDUPTHER

## 2019-04-15 DIAGNOSIS — R52 PAIN: ICD-10-CM

## 2019-04-15 RX ORDER — OXYCODONE HYDROCHLORIDE 5 MG/1
10 TABLET ORAL
Qty: 60 TABLET | Refills: 0 | Status: SHIPPED | OUTPATIENT
Start: 2019-04-15 | End: 2019-05-17 | Stop reason: SDUPTHER

## 2019-04-30 ENCOUNTER — TELEPHONE (OUTPATIENT)
Dept: UROLOGY | Facility: MEDICAL CENTER | Age: 74
End: 2019-04-30

## 2019-05-06 ENCOUNTER — TELEPHONE (OUTPATIENT)
Dept: UROLOGY | Facility: CLINIC | Age: 74
End: 2019-05-06

## 2019-05-10 ENCOUNTER — TELEPHONE (OUTPATIENT)
Dept: UROLOGY | Facility: CLINIC | Age: 74
End: 2019-05-10

## 2019-05-17 DIAGNOSIS — R52 PAIN: ICD-10-CM

## 2019-05-19 RX ORDER — OXYCODONE HYDROCHLORIDE 5 MG/1
10 TABLET ORAL
Qty: 60 TABLET | Refills: 0 | Status: SHIPPED | OUTPATIENT
Start: 2019-05-19 | End: 2019-07-01 | Stop reason: DRUGHIGH

## 2019-05-21 ENCOUNTER — OFFICE VISIT (OUTPATIENT)
Dept: UROLOGY | Facility: CLINIC | Age: 74
End: 2019-05-21
Payer: MEDICARE

## 2019-05-21 DIAGNOSIS — C61 PROSTATE CANCER (HCC): Primary | ICD-10-CM

## 2019-05-21 DIAGNOSIS — R97.20 ELEVATED PSA: ICD-10-CM

## 2019-05-21 PROCEDURE — 99213 OFFICE O/P EST LOW 20 MIN: CPT | Performed by: UROLOGY

## 2019-05-21 RX ORDER — NYSTATIN 100000 [USP'U]/G
POWDER TOPICAL
COMMUNITY

## 2019-06-04 ENCOUNTER — TELEPHONE (OUTPATIENT)
Dept: UROLOGY | Facility: CLINIC | Age: 74
End: 2019-06-04

## 2019-06-17 ENCOUNTER — TELEPHONE (OUTPATIENT)
Dept: UROLOGY | Facility: CLINIC | Age: 74
End: 2019-06-17

## 2019-06-18 ENCOUNTER — OFFICE VISIT (OUTPATIENT)
Dept: CARDIOLOGY CLINIC | Facility: CLINIC | Age: 74
End: 2019-06-18
Payer: MEDICARE

## 2019-06-18 ENCOUNTER — TELEPHONE (OUTPATIENT)
Dept: UROLOGY | Facility: CLINIC | Age: 74
End: 2019-06-18

## 2019-06-18 VITALS — OXYGEN SATURATION: 97 % | SYSTOLIC BLOOD PRESSURE: 106 MMHG | HEART RATE: 52 BPM | DIASTOLIC BLOOD PRESSURE: 52 MMHG

## 2019-06-18 DIAGNOSIS — Z86.73 HISTORY OF CVA (CEREBROVASCULAR ACCIDENT): ICD-10-CM

## 2019-06-18 DIAGNOSIS — I51.81 TAKOTSUBO CARDIOMYOPATHY: Primary | ICD-10-CM

## 2019-06-18 DIAGNOSIS — E78.5 HYPERLIPIDEMIA LDL GOAL <100: ICD-10-CM

## 2019-06-18 DIAGNOSIS — I48.0 PAF (PAROXYSMAL ATRIAL FIBRILLATION) (HCC): ICD-10-CM

## 2019-06-18 PROCEDURE — 99214 OFFICE O/P EST MOD 30 MIN: CPT | Performed by: INTERNAL MEDICINE

## 2019-06-19 ENCOUNTER — TELEPHONE (OUTPATIENT)
Dept: UROLOGY | Facility: CLINIC | Age: 74
End: 2019-06-19

## 2019-06-21 ENCOUNTER — TELEPHONE (OUTPATIENT)
Dept: UROLOGY | Facility: MEDICAL CENTER | Age: 74
End: 2019-06-21

## 2019-06-21 NOTE — TELEPHONE ENCOUNTER
Patient of Dr Ezra Lopez  Wife calling to see what she can do with medication because patient can't swallow such a big pill    Patient received Loli Acevedo

## 2019-06-27 NOTE — TELEPHONE ENCOUNTER
Wife calling to check if options on medications have been decided   Pls call wife back with info thank you

## 2019-06-28 NOTE — TELEPHONE ENCOUNTER
I called medially and left a message on her cell phone and will forward this to Dr Claudia Matias for his review

## 2019-06-28 NOTE — TELEPHONE ENCOUNTER
Patient called back called back line while on line with office patient call disconnected  Asked office to try and call patient

## 2019-06-28 NOTE — TELEPHONE ENCOUNTER
I called again at 4:23 p m  On June 28th I did not leave a message she may speak to Dr Fred Harmon next week

## 2019-06-28 NOTE — TELEPHONE ENCOUNTER
Patient's wife called back  Attempted to transfer to backline at [facility] but patient disconnected after [facility] accepted the call

## 2019-07-01 ENCOUNTER — OFFICE VISIT (OUTPATIENT)
Dept: FAMILY MEDICINE CLINIC | Facility: CLINIC | Age: 74
End: 2019-07-01
Payer: MEDICARE

## 2019-07-01 VITALS
RESPIRATION RATE: 18 BRPM | OXYGEN SATURATION: 96 % | TEMPERATURE: 99 F | SYSTOLIC BLOOD PRESSURE: 114 MMHG | HEART RATE: 69 BPM | DIASTOLIC BLOOD PRESSURE: 64 MMHG

## 2019-07-01 DIAGNOSIS — Z86.73 HISTORY OF CVA (CEREBROVASCULAR ACCIDENT): ICD-10-CM

## 2019-07-01 DIAGNOSIS — I69.359 HEMIPLEGIA AND HEMIPARESIS FOLLOWING CEREBRAL INFARCTION AFFECTING UNSPECIFIED SIDE (HCC): ICD-10-CM

## 2019-07-01 DIAGNOSIS — M25.562 CHRONIC PAIN OF LEFT KNEE: Primary | ICD-10-CM

## 2019-07-01 DIAGNOSIS — G89.29 CHRONIC PAIN OF LEFT KNEE: Primary | ICD-10-CM

## 2019-07-01 DIAGNOSIS — I48.0 PAROXYSMAL ATRIAL FIBRILLATION (HCC): ICD-10-CM

## 2019-07-01 PROBLEM — G47.09 OTHER INSOMNIA: Status: ACTIVE | Noted: 2019-07-01

## 2019-07-01 PROCEDURE — 99214 OFFICE O/P EST MOD 30 MIN: CPT | Performed by: FAMILY MEDICINE

## 2019-07-01 RX ORDER — SENNOSIDES 8.6 MG
650 CAPSULE ORAL EVERY 6 HOURS PRN
COMMUNITY

## 2019-07-01 RX ORDER — OXYCODONE HYDROCHLORIDE 5 MG/1
TABLET ORAL
Qty: 75 TABLET | Refills: 0 | Status: SHIPPED | OUTPATIENT
Start: 2019-07-01 | End: 2019-07-29 | Stop reason: SDUPTHER

## 2019-07-01 RX ORDER — DOCUSATE SODIUM 100 MG/1
100 CAPSULE, LIQUID FILLED ORAL DAILY
COMMUNITY

## 2019-07-01 NOTE — ASSESSMENT & PLAN NOTE
Followed by Orthopedics  Recurrent cortisone injections and injections with Synvisc have been of no benefit  X-rays of the left knee show bone on bone  We discussed the chronic left knee pain and quality of life issues  Cardiology has already said he would be cleared if he was going to have left TKR  I told he and his wife that he needs to discuss this seriously with Orthopedics and even know the rehab would be off all the relief of his chronic knee pain is certainly a quality of life issue  I would not be opposed to him having a left TKR on this basis and I believe that he could tolerate the surgery and his wife is available for his postop rehab and would ever would be necessary  She was  intimately involved with his rehab post CVA

## 2019-07-01 NOTE — PATIENT INSTRUCTIONS
Increase the nighttime oxycodone to 12 5 mg orally  Add acetaminophen 650 mg to that dose at bedtime as well  Continue the Keppra and the baclofen as previously prescribed  Consider discussion with the orthopedic surgeon about a left knee replacement for issues of quality of life  I understand cardiologist has already given approval for that

## 2019-07-01 NOTE — PROGRESS NOTES
Assessment/Plan:    Paroxysmal atrial fibrillation ([facility] [address] )  Followed regularly by cardiologist and most recently was found to remain in sinus rhythm and remains on aspirin instead of anticoagulant  This has worked out fine  Cardiology has tentatively given approval if the patient was to have a left knee replacement  History of CVA (cerebrovascular accident)  Unchanged  Still has dysarthria with out aphasia and still has the severe left hemiplegia  He can walk short distances with the help of his wife and he has no swallowing difficulties  Chronic pain of left knee  Followed by Orthopedics  Recurrent cortisone injections and injections with Synvisc have been of no benefit  X-rays of the left knee show bone on bone  We discussed the chronic left knee pain and quality of life issues  Cardiology has already said he would be cleared if he was going to have left TKR  I told he and his wife that he needs to discuss this seriously with Orthopedics and even know the rehab would be off all the relief of his chronic knee pain is certainly a quality of life issue  I would not be opposed to him having a left TKR on this basis and I believe that he could tolerate the surgery and his wife is available for his postop rehab and would ever would be necessary  She was  intimately involved with his rehab post CVA  Diagnoses and all orders for this visit:    Chronic pain of left knee  -     oxyCODONE (ROXICODONE) 5 mg immediate release tablet; Take 2 5 tablets (12 5 mg) orally at bedtime  History of CVA (cerebrovascular accident)    Paroxysmal atrial fibrillation (HCC)    Hemiplegia and hemiparesis following cerebral infarction affecting unspecified side ([facility] [address] )    Other orders  -     docusate sodium (COLACE) 100 mg capsule; Take 100 mg by mouth daily  -     acetaminophen (TYLENOL) 650 mg CR tablet;  Take 650 mg by mouth every 6 (six) hours as needed for mild pain          Subjective:     Chief Complaint   Patient presents with    Insomnia        Patient ID: Barry Garner  is a 74 y o  male  HPI : Multiple Chronic Medical Conditions and insomnia  The following portions of the patient's history were reviewed and updated as appropriate: allergies, current medications, past family history, past medical history, past social history, past surgical history and problem list     Review of Systems   Constitutional: Negative for activity change, appetite change, fatigue, fever and unexpected weight change  Insomnia  HENT: Negative for congestion, dental problem and sneezing  Eyes: Negative for discharge and visual disturbance  Respiratory: Negative for cough, chest tightness, shortness of breath and wheezing  Cardiovascular: Negative for chest pain, palpitations and leg swelling  Gastrointestinal: Negative for abdominal pain, constipation, diarrhea, nausea and vomiting  Endocrine: Negative for polydipsia and polyuria  Genitourinary: Negative for dysuria and frequency  Musculoskeletal: Positive for arthralgias  Skin: Negative for rash  Allergic/Immunologic: Negative for environmental allergies and food allergies  Neurological: Positive for speech difficulty and weakness  Negative for dizziness and headaches  Hematological: Negative for adenopathy  Psychiatric/Behavioral: Negative for behavioral problems and sleep disturbance  Objective:  Vitals:    07/01/19 1324   BP: 114/64   BP Location: Right arm   Patient Position: Sitting   Cuff Size: Standard   Pulse: 69   Resp: 18   Temp: 99 °F (37 2 °C)   TempSrc: Temporal   SpO2: 96%      Physical Exam   Constitutional: He is oriented to person, place, and time  He appears well-developed and well-nourished  HENT:   Head: Normocephalic  Eyes: Conjunctivae are normal  Right eye exhibits no discharge  Left eye exhibits no discharge  Neck: Normal range of motion  Neck supple  No thyromegaly present     Cardiovascular: Normal rate, regular rhythm and normal heart sounds  No murmur heard  Pulmonary/Chest: Effort normal    Abdominal: Soft  Bowel sounds are normal  There is no tenderness  Musculoskeletal: Normal range of motion  He exhibits tenderness  Lymphadenopathy:     He has no cervical adenopathy  Neurological: He is alert and oriented to person, place, and time  A cranial nerve deficit is present  He exhibits abnormal muscle tone  Coordination abnormal    Dysarthria without aphasia -unchanged from before  Left hemiplegia is unchanged from before  Skin: Skin is warm  No rash noted  Psychiatric: He has a normal mood and affect  His behavior is normal        Patient Instructions   Increase the nighttime oxycodone to 12 5 mg orally  Add acetaminophen 650 mg to that dose at bedtime as well  Continue the Keppra and the baclofen as previously prescribed  Consider discussion with the orthopedic surgeon about a left knee replacement for issues of quality of life  I understand cardiologist has already given approval for that

## 2019-07-01 NOTE — ASSESSMENT & PLAN NOTE
Unchanged  Still has dysarthria with out aphasia and still has the severe left hemiplegia  He can walk short distances with the help of his wife and he has no swallowing difficulties

## 2019-07-01 NOTE — ASSESSMENT & PLAN NOTE
Followed regularly by cardiologist and most recently was found to remain in sinus rhythm and remains on aspirin instead of anticoagulant  This has worked out fine  Cardiology has tentatively given approval if the patient was to have a left knee replacement

## 2019-07-03 NOTE — TELEPHONE ENCOUNTER
I called patient back and left a message that THELMA Ballard left a message with Dr Claudia Matias to review her husbands chart  I informed her they won't be returning to the office til Monday morning but if she needed to call back and speak with someone right away to call the office back, otherwise I will have Dr Claudia Matias call her Monday morning

## 2019-07-08 NOTE — TELEPHONE ENCOUNTER
I did speak to the wife  The problem with [place] is the size of the pill to swallow  He cannot swallow the pill and it cannot be crushed or broken  We will discontinue it at this time and consider other options

## 2019-07-09 ENCOUNTER — TELEPHONE (OUTPATIENT)
Dept: UROLOGY | Facility: MEDICAL CENTER | Age: 74
End: 2019-07-09

## 2019-07-15 NOTE — TELEPHONE ENCOUNTER
Patient's wife called and rescheduled appointment with Dr Anne Farias instead on 7/25/19    She stated this was per doctor's request

## 2019-07-25 ENCOUNTER — OFFICE VISIT (OUTPATIENT)
Dept: UROLOGY | Facility: CLINIC | Age: 74
End: 2019-07-25
Payer: COMMERCIAL

## 2019-07-25 DIAGNOSIS — C61 PROSTATE CANCER (HCC): Primary | ICD-10-CM

## 2019-07-25 PROCEDURE — 99213 OFFICE O/P EST LOW 20 MIN: CPT | Performed by: UROLOGY

## 2019-07-25 NOTE — PROGRESS NOTES
Progress Note - Urology  Rob December 74 y o  male MRN: [id number]  Encounter: [id number]      Chief Complaint:   Chief Complaint   Patient presents with    Prostate Cancer     6 Month Eligard Injection       HPI:    This is a 64-year-old male who initially was diagnosed with prostate cancer in 2001 and underwent a robotic prostatectomy  He remained free of disease until PSA elevation was noted in 2011  He has since been on androgen deprivation therapy which has been suppressive until 2014 at which time he was restarted on ADT  There has been progressive elevation and he was placed on bicalutamide 50 mg daily  In addition he was placed on a an off-label use of metformin 1000 mg daily  PSA has slowly progressed and is currently 12 7  Id was 12 7 in 2014 when he begin retreatment  Attempt was made to place him on Xtandi  However he cannot swallow the tablet and it cannot be crushed  MEDS:    Current Outpatient Medications:     acetaminophen (TYLENOL) 650 mg CR tablet, Take 650 mg by mouth every 6 (six) hours as needed for mild pain, Disp: , Rfl:     aspirin (ECOTRIN LOW STRENGTH) 81 mg EC tablet, Take 1 tablet by mouth daily, Disp: , Rfl:     atorvastatin (LIPITOR) 80 mg tablet, Take 1 tablet by mouth daily, Disp: , Rfl:     baclofen 10 mg tablet, Take 1/2 tablet (5mg) orally at 0600 and 1200  Take one whole tablet (10mg) orally at 1800 and 2400  (Patient taking differently: Take 1/2 tablet (5mg) orally at 0800 and 1400   Take one whole tablet (10mg) orally at 1800 and 2400 ), Disp: 540 tablet, Rfl: 5    bicalutamide (CASODEX) 50 mg tablet, Take 1 tablet by mouth daily, Disp: , Rfl:     Cholecalciferol (VITAMIN D3) 1000 units CAPS, Take 2 tablets by mouth daily, Disp: , Rfl:     cyanocobalamin (VITAMIN B-12) 100 mcg tablet, Take 2 capsules by mouth, Disp: , Rfl:     denosumab (PROLIA) 60 mg/mL, Inject under the skin every 6 (six) months, Disp: , Rfl:     diclofenac sodium (VOLTAREN) 1 %, AMA TOPICALLY BID AA, Disp: , Rfl: 2    docusate sodium (COLACE) 100 mg capsule, Take 100 mg by mouth daily, Disp: , Rfl:     escitalopram (LEXAPRO) 10 mg tablet, TAKE 1 TABLET(10 MG) BY MOUTH DAILY, Disp: 90 tablet, Rfl: 1    hydrocortisone 0 5 % cream, Insert into the rectum daily as needed, Disp: , Rfl:     leuprolide (ELIGARD) 45 MG injection, Inject under the skin, Disp: , Rfl:     levETIRAcetam (KEPPRA) 100 mg/mL oral solution, Take 1 mL by mouth 2 (two) times a day, Disp: , Rfl:     nystatin (MYCOSTATIN) powder, Apply topically, Disp: , Rfl:     oxyCODONE (ROXICODONE) 5 mg immediate release tablet, Take 2 5 tablets (12 5 mg) orally at bedtime  , Disp: 75 tablet, Rfl: 0    polyethylene glycol (MIRALAX) 17 g packet, Take by mouth, Disp: , Rfl:     senna-docusate sodium (SENNA S) 8 6-50 mg per tablet, Take 1-2 tablets by mouth Daily, Disp: , Rfl:     metFORMIN (GLUCOPHAGE) 500 mg tablet, Take 1 tablet (500 mg total) by mouth 2 (two) times a day with meals for 90 days, Disp: 180 tablet, Rfl: 3      PMH:  Past Medical History:   Diagnosis Date    Gallstone pancreatitis 09/13/2015    Osteoarthritis of left knee     l knee pain    Vertigo          PSH  Past Surgical History:   Procedure Laterality Date    CHOLECYSTECTOMY  09/2015    COLONOSCOPY  09/30/2015    GANGLION CYST EXCISION Left 04/29/2016    ganglion block knee          ROS:  Review of Systems      Vitals: There were no vitals taken for this visit  Physical Exam:     No clinical examination was performed today the appointment was spent in review of he has clinical findings and progressive of PSA  Discussion of options of management     he is in a wheelchair and in no acute distress at this time     Lab, Imaging and other studies:  No results found for this or any previous visit (from the past 672 hour(s))          IMPRESSION:   stage IV prostate cancer on androgen deprivation therapy with progression of PSA    PLAN:   discussion of options at this point will be to increase the bicalutamide to 100 mg daily  Also increasing metformin to 2000 mg a day  They are aware this is off-label use  They want to try this while he is getting a knee replacement and rehab  I did suggest that we have a consultation with Medical Oncology to review other options of management going forward    He is given 45 mg leuprolide today and I will recheck him in 6 months

## 2019-07-29 DIAGNOSIS — G89.29 CHRONIC PAIN OF LEFT KNEE: ICD-10-CM

## 2019-07-29 DIAGNOSIS — M25.562 CHRONIC PAIN OF LEFT KNEE: ICD-10-CM

## 2019-07-29 RX ORDER — OXYCODONE HYDROCHLORIDE 5 MG/1
TABLET ORAL
Qty: 75 TABLET | Refills: 0 | Status: SHIPPED | OUTPATIENT
Start: 2019-07-29

## 2019-08-15 ENCOUNTER — TELEPHONE (OUTPATIENT)
Dept: FAMILY MEDICINE CLINIC | Facility: CLINIC | Age: 74
End: 2019-08-15

## 2019-08-15 NOTE — TELEPHONE ENCOUNTER
I'm OK with the change of the oxycodone to 5mg PO HS but what dose of Lyrica are they recommending ? ?

## 2019-08-15 NOTE — TELEPHONE ENCOUNTER
Spoke to patients wife, she states patient will be talking Lyrica 50 mg 1 capsule at bedtime for 1 week after that they will increase to 100 mg 2 capsules at bedtime  Patient also was schedule for MRI of the spine and x-rays of the left hip

## 2019-08-15 NOTE — TELEPHONE ENCOUNTER
Patients wife called stating she went  To the orthopaedic office they will not operate on the knee  She also went to the pain specialist and they suggested they cut down on the oxycodone 12 5 mg to 5 mg and they start it Lyrica   Patients wife would like to know if this is okay with you please advise

## 2019-08-20 ENCOUNTER — TRANSCRIBE ORDERS (OUTPATIENT)
Dept: ADMINISTRATIVE | Facility: HOSPITAL | Age: 74
End: 2019-08-20

## 2019-08-20 DIAGNOSIS — C61 PROSTATE CANCER (HCC): Primary | ICD-10-CM

## 2019-08-20 DIAGNOSIS — M54.50 ACUTE BILATERAL LOW BACK PAIN WITHOUT SCIATICA: Primary | ICD-10-CM

## 2019-08-20 RX ORDER — BICALUTAMIDE 50 MG/1
100 TABLET, FILM COATED ORAL DAILY
Qty: 180 TABLET | Refills: 1 | Status: CANCELLED | OUTPATIENT
Start: 2019-08-20

## 2019-08-20 NOTE — TELEPHONE ENCOUNTER
Medication Refill Request   Bicalutamide & metformin     Patient of Dr Nunez Running seen at [facility]  Calling to request updated script for the below to be faxed directly to the [address] in [facility] at [phone]     " increase the bicalutamide to 100 mg daily  Also increasing metformin to 2000 mg a day   " (office notes from 7/25 )     Please include last 4 of ssn when faxing [phone]  Emmanuel Carson can be reached at [phone] 
The patient was last seen on 7/25/19 by Dr Soledad Nichols in the [facility] location; continuation/modification of the medication was authorized at that time and as listed below  Request for both drugs, 90 day supply with 3 refills was queued and forwarded to Tony Pretty PA-C (R) for approval   Both scripts need to be PRINTED, SIGNED and then FAXED to the [facility] in [facility] (fax [phone])  Last 4 digits of the patient's SS# were also added in the [facility] section 
24.9

## 2019-08-21 DIAGNOSIS — C61 PROSTATE CANCER (HCC): Primary | ICD-10-CM

## 2019-08-21 RX ORDER — BICALUTAMIDE 50 MG/1
TABLET, FILM COATED ORAL
Qty: 60 TABLET | Refills: 6 | Status: SHIPPED | OUTPATIENT
Start: 2019-08-21

## 2019-09-01 ENCOUNTER — HOSPITAL ENCOUNTER (OUTPATIENT)
Dept: RADIOLOGY | Facility: HOSPITAL | Age: 74
Discharge: HOME | End: 2019-09-01
Payer: MEDICARE

## 2019-09-01 ENCOUNTER — HOSPITAL ENCOUNTER (OUTPATIENT)
Dept: RADIOLOGY | Facility: HOSPITAL | Age: 74
Discharge: HOME | End: 2019-09-01
Attending: ANESTHESIOLOGY
Payer: MEDICARE

## 2019-09-01 DIAGNOSIS — M25.559 PAIN IN HIP JOINT: ICD-10-CM

## 2019-09-01 DIAGNOSIS — M54.50 ACUTE BILATERAL LOW BACK PAIN WITHOUT SCIATICA: ICD-10-CM

## 2019-09-01 PROCEDURE — 72148 MRI LUMBAR SPINE W/O DYE: CPT

## 2019-09-01 PROCEDURE — 73502 X-RAY EXAM HIP UNI 2-3 VIEWS: CPT

## 2019-09-16 ENCOUNTER — TELEPHONE (OUTPATIENT)
Dept: FAMILY MEDICINE CLINIC | Facility: CLINIC | Age: 74
End: 2019-09-16

## 2019-09-16 NOTE — TELEPHONE ENCOUNTER
Spoke with Mrs Mejia -  Denies any fever or chills , appetite is good, keeping himself well hydrated

## 2019-09-16 NOTE — TELEPHONE ENCOUNTER
Can use Delsym cough syrup-this is an over-the-counter cough suppressant  Certainly if he develops fever or chills or is coughing up purulence mucus, I need to know about it

## 2019-09-16 NOTE — TELEPHONE ENCOUNTER
Call from pt spouse Carlota Chew states that pt is sneezing and coughing up a lot of mucus  She did mention that they sit outside for 3 hours daily  Pt is sleeping well, no fever, he is eating and drinking  Spouse would like Dr Neal to suggest a OTC  Carlota Chew can be reached at the number on file thank you

## 2019-09-23 ENCOUNTER — TELEPHONE (OUTPATIENT)
Dept: SURGICAL ONCOLOGY | Facility: CLINIC | Age: 74
End: 2019-09-23

## 2019-09-23 NOTE — TELEPHONE ENCOUNTER
New Patient Encounter    New Patient Intake Form   Patient Details:  Liz Upton  1945  [id number]    Background Information:  [id number] starts by opening a telephone encounter and gathering the following information   Who is calling to schedule? If not self, relationship to patient? Self    Referring Provider Stephane Osborn   What is the diagnosis? Elevated psa   When was the diagnosis? 2019   Is patient aware of diagnosis? Yes   Reason for visit? NP DX   Have you had any testing done? If so: when, where? Yes   Are records in [employer]? yes   Was the patient told to bring a disk? no   Scheduling Information:   Preferred [place]:  Any     Requesting Specific Provider? ali   Are there any dates/time the patient cannot be seen? no   Counseling Pre-Screen:  If the patient answers YES to any of the below questions, please route to the appropriate location specific counselor    Have you felt anxious or worried about cancer and the treatment you are receiving? No   Has your diagnosis caused physical, emotional, or financial hardship for you? No   Note: Do not ask the patient about transportation issues/needs  Please notate if the patient brings it up and the counselor will schedule accordingly  Miscellaneous: na    After completing the above information, please route to Financial Counselor and the appropriate Nurse Navigator for review

## 2019-10-04 ENCOUNTER — TELEPHONE (OUTPATIENT)
Dept: FAMILY MEDICINE CLINIC | Facility: CLINIC | Age: 74
End: 2019-10-04

## 2019-10-04 NOTE — TELEPHONE ENCOUNTER
Wife calling patient has a cold and is using delsym but has a lot of mucus  What else can she try?    Patient is s/p stroke

## 2019-10-04 NOTE — TELEPHONE ENCOUNTER
Saline for his nose to help drain  Can try mucinex to help with the mucous  Also warm liquids work well   Keep us posted

## 2019-10-18 ENCOUNTER — TELEPHONE (OUTPATIENT)
Dept: FAMILY MEDICINE CLINIC | Facility: CLINIC | Age: 74
End: 2019-10-18

## 2019-10-18 NOTE — TELEPHONE ENCOUNTER
Has a decongestant in it  And that isn't good with any urinary issues   Would try mucinex  They do have a liquid for children

## 2019-10-18 NOTE — TELEPHONE ENCOUNTER
Call from pt spouse Sruthi Stewart states that has a drip behind his throat that is giving him a cough and spouse wants to know If its ok if patient can take the medication Coricidin OTC, this also comes in liquid form as well since pt crushes his medications  Please advise thank you

## 2019-11-21 ENCOUNTER — CONSULT (OUTPATIENT)
Dept: HEMATOLOGY ONCOLOGY | Facility: CLINIC | Age: 74
End: 2019-11-21
Payer: MEDICARE

## 2019-11-21 VITALS
DIASTOLIC BLOOD PRESSURE: 68 MMHG | OXYGEN SATURATION: 98 % | HEART RATE: 59 BPM | TEMPERATURE: 98.3 F | RESPIRATION RATE: 16 BRPM | SYSTOLIC BLOOD PRESSURE: 104 MMHG

## 2019-11-21 DIAGNOSIS — C61 PROSTATE CANCER (HCC): Primary | ICD-10-CM

## 2019-11-21 PROCEDURE — 99205 OFFICE O/P NEW HI 60 MIN: CPT | Performed by: INTERNAL MEDICINE

## 2019-11-21 RX ORDER — PREGABALIN 100 MG/1
CAPSULE ORAL EVERY 12 HOURS
COMMUNITY

## 2019-11-21 NOTE — PROGRESS NOTES
Oncology Outpatient Consult Note  Minh Mejia  66 y o  male MRN: @ Encounter: [id number]        Date:  11/21/2019        CC:  History of prostate cancer with rising PSA      HPI:  Minh Mejia  is seen for initial consultation 11/21/2019 regarding History of prostate cancer now with a rising PSA  The patient was diagnosed with prostate cancer In 2001 and underwent a robotic prostatectomy  His PSA started going up in 2011 and so he was put on androgen deprivation therapy which had been suppressive according to the urologist until 2014 at which time he was restarted on ADT  He has now had a rising PSA which has gone up to 12 7  He has been placed on Biclutamide 50 mg daily  He was referred to see us for further therapy  He cannot swallow pills and so could not be placed on Glennette Ezequiel by his urologist  The patient's ECOG performance status is a 3 since she has had 2 strokes in the past  He does understand what we are talking about today and has difficulty articulating  His wife states he cannot move independently and needs to be taken out of bed and put into his wheelchair where he is today  Denies any nausea denies any vomiting denies any diarrhea  His wife states she was hesitant to put him on anything that would cause side effects or would affect his quality of life  He has chronic pain issues and osteopenia apparently for which he does get Bone strengthening agent  He is also on metformin as off label use  Apart from his neurologic issues and his speech issues from his stroke the rest of his 14 point review of systems today was negative  Test Results:    Imaging: No results found      Labs:   Lab Results   Component Value Date    WBC 4 93 12/17/2015    HGB 13 8 12/17/2015    HCT 43 0 12/17/2015    MCV 95 12/17/2015     12/17/2015     Lab Results   Component Value Date     12/18/2015    K 3 3 (L) 12/18/2015     12/18/2015    CO2 27 12/18/2015    ANIONGAP 8 12/18/2015    BUN 9 12/18/2015 CREATININE 0 33 (L) 2015    GLUCOSE 82 2015    CALCIUM 8 8 2015    AST 11 2015    ALT 18 2015    ALKPHOS 62 2015    PROT 6 5 2015    BILITOT 0 39 2015         Lab Results   Component Value Date    PSA 12 7 (H) 2015    PSA 0 3 2014    PSA 0 1 2014       ROS: As stated in history of present illness otherwise her 14 point review of systems today was negative  Active Problems:   Patient Active Problem List   Diagnosis    H/O cardiomyopathy    Paroxysmal atrial fibrillation (HCC)    CVA (cerebrovascular accident due to intracerebral hemorrhage) [facility]    History of CVA (cerebrovascular accident)    Seizure disorder ([facility] )    Dystonia    Chronic pain of left knee    Leg pain, diffuse, left    Other insomnia       Past Medical History:   Past Medical History:   Diagnosis Date    Gallstone pancreatitis 2015    Osteoarthritis of left knee     l knee pain    Vertigo        Surgical History:   Past Surgical History:   Procedure Laterality Date    CHOLECYSTECTOMY  2015    COLONOSCOPY  2015    GANGLION CYST EXCISION Left 2016    ganglion block knee        Family History:  No family history on file  Cancer-related family history is not on file      Social History:   Social History     Socioeconomic History    Marital status: /Civil Union     Spouse name: Not on file    Number of children: Not on file    Years of education: Not on file    Highest education level: Not on file   Occupational History    Not on file   Social Needs    Financial resource strain: Not on file    Food insecurity:     Worry: Not on file     Inability: Not on file    Transportation needs:     Medical: Not on file     Non-medical: Not on file   Tobacco Use    Smoking status: Former Smoker     Types: Cigarettes     Last attempt to quit: 1995     Years since quittin 9    Smokeless tobacco: Never Used    Tobacco comment: passive smoke exposure   Substance and Sexual Activity    Alcohol use: No    Drug use: No    Sexual activity: Not Currently   Lifestyle    Physical activity:     Days per week: Not on file     Minutes per session: Not on file    Stress: Not on file   Relationships    Social connections:     Talks on phone: Not on file     Gets together: Not on file     Attends Jain service: Not on file     Active member of club or organization: Not on file     Attends meetings of clubs or organizations: Not on file     Relationship status: Not on file    Intimate partner violence:     Fear of current or ex partner: Not on file     Emotionally abused: Not on file     Physically abused: Not on file     Forced sexual activity: Not on file   Other Topics Concern    Not on file   Social History Narrative    Not on file       Current Medications:   Current Outpatient Medications   Medication Sig Dispense Refill    acetaminophen (TYLENOL) 650 mg CR tablet Take 650 mg by mouth every 6 (six) hours as needed for mild pain      aspirin (ECOTRIN LOW STRENGTH) 81 mg EC tablet Take 1 tablet by mouth daily      atorvastatin (LIPITOR) 80 mg tablet Take 1 tablet by mouth daily      baclofen 10 mg tablet Take 1/2 tablet (5mg) orally at 0600 and 1200  Take one whole tablet (10mg) orally at 1800 and 2400  (Patient taking differently: Take 1/2 tablet (5mg) orally at 0800 and 1400   Take one whole tablet (10mg) orally at 1800 and 2400 ) 540 tablet 5    bicalutamide (CASODEX) 50 mg tablet Take 2 tablets daily 60 tablet 6    Cholecalciferol (VITAMIN D3) 1000 units CAPS Take 2 tablets by mouth daily      cyanocobalamin (VITAMIN B-12) 100 mcg tablet Take 2 capsules by mouth      denosumab (PROLIA) 60 mg/mL Inject under the skin every 6 (six) months      diclofenac sodium (VOLTAREN) 1 % AMA TOPICALLY BID AA  2    docusate sodium (COLACE) 100 mg capsule Take 100 mg by mouth daily      escitalopram (LEXAPRO) 10 mg tablet TAKE 1 TABLET(10 MG) BY MOUTH DAILY 90 tablet 1    hydrocortisone 0 5 % cream Insert into the rectum daily as needed      leuprolide (ELIGARD) 45 MG injection Inject under the skin      levETIRAcetam (KEPPRA) 100 mg/mL oral solution Take 1 mL by mouth 2 (two) times a day      metFORMIN (GLUCOPHAGE) 1000 MG tablet Take 1 tablet (1,000 mg total) by mouth 2 (two) times a day with meals 60 tablet 6    nystatin (MYCOSTATIN) powder Apply topically      oxyCODONE (ROXICODONE) 5 mg immediate release tablet Take 2 5 tablets (12 5 mg) orally at bedtime  75 tablet 0    polyethylene glycol (MIRALAX) 17 g packet Take by mouth      pregabalin (LYRICA) 100 mg capsule Every 12 hours      senna-docusate sodium (SENNA S) 8 6-50 mg per tablet Take 1-2 tablets by mouth Daily      metFORMIN (GLUCOPHAGE) 500 mg tablet Take 1 tablet (500 mg total) by mouth 2 (two) times a day with meals for 90 days 180 tablet 3     No current facility-administered medications for this visit  Allergies: Allergies   Allergen Reactions    No Known Allergies          Physical Exam:    There is no height or weight on file to calculate BSA  Wt Readings from Last 3 Encounters:   04/26/16 63 5 kg (140 lb)   03/21/16 63 5 kg (140 lb)   03/15/16 63 5 kg (140 lb)        Temp Readings from Last 3 Encounters:   11/21/19 98 3 °F (36 8 °C) (Tympanic)   07/01/19 99 °F (37 2 °C) (Temporal)   04/26/16 97 8 °F (36 6 °C)        BP Readings from Last 3 Encounters:   11/21/19 104/68   07/01/19 114/64   06/18/19 106/52         Pulse Readings from Last 3 Encounters:   11/21/19 59   07/01/19 69   06/18/19 (!) 52      Physical Exam     Constitutional   General appearance: No acute distress, well appearing and well nourished  Eyes   Conjunctiva and lids: No swelling, erythema or discharge  Pupils and irises: Equal, round and reactive to light      Ears, Nose, Mouth, and Throat   External inspection of ears and nose: Normal     Nasal mucosa, septum, and turbinates: Normal without edema or erythema  Oropharynx: Normal with no erythema, edema, exudate or lesions  Pulmonary   Respiratory effort: No increased work of breathing or signs of respiratory distress  Auscultation of lungs: Clear to auscultation  Cardiovascular   Palpation of heart: Normal PMI, no thrills  Auscultation of heart: Normal rate and rhythm, normal S1 and S2, without murmurs  Examination of extremities for edema and/or varicosities: Normal     Carotid pulses: Normal     Abdomen   Abdomen: Non-tender, no masses  Liver and spleen: No hepatomegaly or splenomegaly  Lymphatic   Palpation of lymph nodes in neck: No lymphadenopathy  Musculoskeletal   Gait and station: Normal     Digits and nails: Normal without clubbing or cyanosis  Inspection/palpation of joints, bones, and muscles: Normal     Skin   Skin and subcutaneous tissue: Normal without rashes or lesions  Neurologic   Cranial nerves: Cranial nerves 2-12 intact  Sensation: No sensory loss  Psychiatric   Orientation to person, place, and time: Normal     Mood and affect: Normal           Assessment/ Plan:      The patient is an unfortunate 53-year-old male who has had 2 strokes which have left him quite debilitated  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 global aphasia  His ECOG performance status is a 3  His wife states he had a low-grade prostate cancer In 2001  The pathology is not available at [place] [address] as it was a long time ago and his urologist note does not state where his Shira score was although based on the natural history I tend to agree with the wife as she remembers it to be a low-grade I e  [place] 5-6 according to her  Regardless he does have a slowly rising PSA level  I advised them the 2 options would be based on his overall performance status to just continue the current medication he is on work to restage him and then decide what other therapy he would be eligible for   The patient is in favor of restaging as is his wife  She states she is not sure if she wouldn't wish for him to get anything that could cause side effects but she does wish to know how much disease he has  I will arrange for the staging  Based on the results we will then make recommendations  If he has an isolated prostate recurrence radiation could be a possibility if they wish to proceed that way  If not Ketoconazole, zytiga, or xtandi all possibilities  Goals and Barriers:  Current Goal:  Prolong Survival from Prostate Cancer  Barriers: None  Patient's Capacity to Self Care:  Patient able to self care  Portions of the record may have been created with voice recognition software   Occasional wrong word or "sound a like" substitutions may have occurred due to the inherent limitations of voice recognition software   Read the chart carefully and recognize, using context, where substitutions have occurred

## 2019-12-12 ENCOUNTER — TELEPHONE (OUTPATIENT)
Dept: FAMILY MEDICINE CLINIC | Facility: CLINIC | Age: 74
End: 2019-12-12

## 2019-12-12 DIAGNOSIS — R05.9 COUGH: Primary | ICD-10-CM

## 2019-12-12 NOTE — TELEPHONE ENCOUNTER
Wife is calling because he is having a head cold and coughing having a hard time bring up the mucus  She is giving him mucinex is there anything else patient should try taking? ?  Wife is concerned about getting a pneumonia dr Juan Denney or james out of the office she will like a call back

## 2019-12-13 RX ORDER — DOXYCYCLINE HYCLATE 100 MG/1
100 CAPSULE ORAL EVERY 12 HOURS SCHEDULED
Qty: 14 CAPSULE | Refills: 0 | Status: SHIPPED | OUTPATIENT
Start: 2019-12-13 | End: 2019-12-20

## 2019-12-13 NOTE — TELEPHONE ENCOUNTER
Started with head cold and using flonase  Watery eyes  Difficulty with mucous  Occasionally slightly yellow  Very tired eating or drinking  He is homebound at this time  No fevers or chills   Pulse ox 98%   Having lethargy  Is using mucinex  States she can't get him out easily  Concern due to feeling tired that may be getting pneumonia  Taking fluids with encouragement   Sitting up 4 hrs at a time but then very tired  PET scan coming up soon  Denies fevers   Will place on doxy and see if improves   Advised if any worse needs to go to ER

## 2020-01-06 ENCOUNTER — TELEPHONE (OUTPATIENT)
Dept: FAMILY MEDICINE CLINIC | Facility: CLINIC | Age: 75
End: 2020-01-06

## 2020-01-06 NOTE — TELEPHONE ENCOUNTER
Patient's wife is calling to ask Dr Dimitry Avilez, patient has a cough, productive, no trouble breathing, no fever  She would like to get him a chest x-ray  Patient is homebound  Is this possible?

## 2020-01-06 NOTE — TELEPHONE ENCOUNTER
If he just has a cough and has no fever or chills and his appetite is okay then he could just have a cold  I do not see the need to get a chest x-ray at this point unless he should develop further symptoms  I do not know if getting a chest x-ray at home is available-my assumption is he would have to go to a facility to at least get a good chest x-ray  There are a lot of winter virus is going around  Call me if over the next 48 hours things are worsening or changing

## 2020-01-09 ENCOUNTER — TELEPHONE (OUTPATIENT)
Dept: HEMATOLOGY ONCOLOGY | Facility: CLINIC | Age: 75
End: 2020-01-09

## 2020-01-09 ENCOUNTER — TELEPHONE (OUTPATIENT)
Dept: FAMILY MEDICINE CLINIC | Facility: CLINIC | Age: 75
End: 2020-01-09

## 2020-01-09 NOTE — TELEPHONE ENCOUNTER
Spouse called requesting if a chest xray can be order for patient  States she believes patient has something brewing up and wants to play it safe be fore 2/16 pet scan appt  Spouse states patient has had a cough and congestion but no fever  Patient has declined since last appointment at [facility]  Wife states if this is sent in before noon tomorrow patient is able to have xray of the chest done same day  Requesting xray to be done with physicians mobile unit Fax [phone]

## 2020-01-09 NOTE — TELEPHONE ENCOUNTER
PT is now homebound and are using [facility]  And they need new scripts faxed to [facility] [phone] they will go out to PT and draw blood

## 2020-01-10 DIAGNOSIS — R05.9 COUGH: Primary | ICD-10-CM

## 2020-01-13 DIAGNOSIS — R05.9 COUGH: Primary | ICD-10-CM

## 2020-01-13 RX ORDER — DOXYCYCLINE HYCLATE 100 MG/1
100 CAPSULE ORAL EVERY 12 HOURS SCHEDULED
Qty: 14 CAPSULE | Refills: 0 | Status: SHIPPED | OUTPATIENT
Start: 2020-01-13 | End: 2020-01-20

## 2020-01-13 NOTE — TELEPHONE ENCOUNTER
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

## 2020-01-13 NOTE — TELEPHONE ENCOUNTER
Patient spouse wants to know if you have any recommendation to bring up mucus, Mrs Celaya Kitchen has been giving him Mucinex

## 2020-01-13 NOTE — TELEPHONE ENCOUNTER
Mrs Harden Dec called back and stated that he is still coughing and it is about the same as before  He isn't getting better or worse  She said the cough is sometimes productive but the mucus is more of a white color and not yellow  He doesn't and hasn't had any fevers  He is eating and drinking ok  She said he is just really tired all the time  She has been giving him mucinex which helps a little bit

## 2020-01-21 ENCOUNTER — TELEPHONE (OUTPATIENT)
Dept: INFUSION CENTER | Facility: HOSPITAL | Age: 75
End: 2020-01-21

## 2020-01-21 ENCOUNTER — TELEPHONE (OUTPATIENT)
Dept: HEMATOLOGY ONCOLOGY | Facility: MEDICAL CENTER | Age: 75
End: 2020-01-21

## 2020-01-21 NOTE — TELEPHONE ENCOUNTER
Discussed with Toney Hatchet that it is her 's decision for hospice and if he decides to do so we will help arrange  Patient's  wife "will pray about it over night " and I will call her tomorrow to discuss further

## 2020-01-21 NOTE — TELEPHONE ENCOUNTER
Per Dr Yaneth Morgan, we can't make the decision for the patient to go to hospice without staging which would come from the scan  If the patient decides that they don't want to come in for the scan or for the office visit and the patient decides to go the hospice route then we can help set that up but the decision has to come from the patient

## 2020-01-21 NOTE — TELEPHONE ENCOUNTER
Patient has some concerns about her  health and ask if one of our nurses can call her at [phone]

## 2020-01-21 NOTE — TELEPHONE ENCOUNTER
Per patient's wife:"patient has gone downhill since last visit"  Would Dr Raina Morel agree to place patient on hospice? Please discuss with Dr Raina Morel  and I will call patient 's wife back  Thanks

## 2020-01-21 NOTE — TELEPHONE ENCOUNTER
Spoke with Earline Patel, patient's wife and she stated that the patient is barely coherent, he's barely eating and she can see him deteriorating in front of her eyes because he basically looks like a skeleton  She stated that he is not in pain except for his chronic knee pain and she was wondering if it was necessary to get the PET scan and come in for a visit because if it was up to her, she'd go the home hospice route  She mentioned the hassle it would be to get the patient out of the house and [place] and then again for the [address]  She would like Dr Banks Kras opinion on how to proceed

## 2020-01-21 NOTE — TELEPHONE ENCOUNTER
Left message on Olena's cell phone to really Dr Yesi Lion message regarding hospice  Asked Vanessa Vences to call me back to discuss  Home number did not go through

## 2020-01-21 NOTE — TELEPHONE ENCOUNTER
Returned call, Maria Guadalupe Killian on another line and will call Hopeline back when she is available

## 2020-01-22 ENCOUNTER — TELEPHONE (OUTPATIENT)
Dept: UROLOGY | Facility: MEDICAL CENTER | Age: 75
End: 2020-01-22

## 2020-01-22 ENCOUNTER — TELEPHONE (OUTPATIENT)
Dept: INFUSION CENTER | Facility: HOSPITAL | Age: 75
End: 2020-01-22

## 2020-01-22 NOTE — TELEPHONE ENCOUNTER
Spoke with wife, Brian Martínez would like us to cancel PET scan and f/u with Dr Rishabh Centeno  Patient's wife will go through PCP, Dr Gail Rodarte for further care  Will pass on to Dr Paul Gamboa to cancel testing and appt and pass on to Dr Rishabh Centeno the patient's wife's sincere thanks

## 2020-01-22 NOTE — TELEPHONE ENCOUNTER
I called and left a message on her answering machine  She may call me on my personal phone for this matter

## 2020-01-22 NOTE — TELEPHONE ENCOUNTER
This is a patient of Dr Bell Rudolph seen by Tom Holland in [place]  Patient wife called and said  is going to hospice and wanted Tom Holland to call her at [phone]

## 2020-01-23 ENCOUNTER — TELEPHONE (OUTPATIENT)
Dept: FAMILY MEDICINE CLINIC | Facility: CLINIC | Age: 75
End: 2020-01-23

## 2020-01-23 DIAGNOSIS — R53.1 WEAKNESS: ICD-10-CM

## 2020-01-23 DIAGNOSIS — Z86.73 HISTORY OF CVA (CEREBROVASCULAR ACCIDENT): Primary | ICD-10-CM

## 2020-01-23 NOTE — PROGRESS NOTES
spoke with Zully Shah at [facility]  [phone] to open case for Hospice   Referral  Demographics and office notes faxed to [phone]

## 2020-01-23 NOTE — TELEPHONE ENCOUNTER
Spoke with Mrs Jessika Villavicencio - states that her  is on the end of his journey, not eating or drinking , weight loss, not opening his eyes but still can squeezed her hands, not in distressed  Pulse is 110, O2 sat 86%  Mrs Jessika Villavicencio wants to get Dr Randall Gómez opinion about getting hospice care   Please advise

## 2020-01-23 NOTE — TELEPHONE ENCOUNTER
Pt's wife called please call her back on her cell number in her chart it's about pt going into hospice

## 2020-01-23 NOTE — TELEPHONE ENCOUNTER
PATIENTS WIFE REQUESTING A CALL BACK FROM YOU REGARDING   PLEASE CALL HER TODAY SHE WILL BE WAITING

## 2020-02-05 ENCOUNTER — TELEPHONE (OUTPATIENT)
Dept: FAMILY MEDICINE CLINIC | Facility: CLINIC | Age: 75
End: 2020-02-05

## 2020-02-05 NOTE — TELEPHONE ENCOUNTER
Mrs Dereck Son is calling to say thank you  She is so grateful for the care given to her late  Bonnie Kim   To Dr Mami Lyles and the entire office staff